# Patient Record
Sex: MALE | Race: BLACK OR AFRICAN AMERICAN | NOT HISPANIC OR LATINO | URBAN - METROPOLITAN AREA
[De-identification: names, ages, dates, MRNs, and addresses within clinical notes are randomized per-mention and may not be internally consistent; named-entity substitution may affect disease eponyms.]

---

## 2017-04-18 ENCOUNTER — OUTPATIENT (OUTPATIENT)
Dept: OUTPATIENT SERVICES | Facility: HOSPITAL | Age: 57
LOS: 1 days | Discharge: HOME | End: 2017-04-18

## 2017-06-27 DIAGNOSIS — I10 ESSENTIAL (PRIMARY) HYPERTENSION: ICD-10-CM

## 2019-07-14 ENCOUNTER — EMERGENCY (EMERGENCY)
Facility: HOSPITAL | Age: 59
LOS: 0 days | Discharge: HOME | End: 2019-07-14
Admitting: EMERGENCY MEDICINE
Payer: MEDICAID

## 2019-07-14 VITALS
DIASTOLIC BLOOD PRESSURE: 78 MMHG | RESPIRATION RATE: 18 BRPM | OXYGEN SATURATION: 98 % | SYSTOLIC BLOOD PRESSURE: 145 MMHG | TEMPERATURE: 97 F | HEART RATE: 65 BPM

## 2019-07-14 VITALS — HEIGHT: 70 IN | WEIGHT: 177.91 LBS

## 2019-07-14 DIAGNOSIS — L98.499 NON-PRESSURE CHRONIC ULCER OF SKIN OF OTHER SITES WITH UNSPECIFIED SEVERITY: ICD-10-CM

## 2019-07-14 DIAGNOSIS — L03.115 CELLULITIS OF RIGHT LOWER LIMB: ICD-10-CM

## 2019-07-14 DIAGNOSIS — Z88.6 ALLERGY STATUS TO ANALGESIC AGENT: ICD-10-CM

## 2019-07-14 DIAGNOSIS — M25.512 PAIN IN LEFT SHOULDER: ICD-10-CM

## 2019-07-14 PROCEDURE — 73030 X-RAY EXAM OF SHOULDER: CPT | Mod: 26,LT

## 2019-07-14 PROCEDURE — 99283 EMERGENCY DEPT VISIT LOW MDM: CPT

## 2019-07-14 PROCEDURE — 73630 X-RAY EXAM OF FOOT: CPT | Mod: 26,RT

## 2019-07-14 RX ORDER — AZTREONAM 2 G
1 VIAL (EA) INJECTION
Qty: 28 | Refills: 0
Start: 2019-07-14 | End: 2019-07-20

## 2019-07-14 RX ORDER — KETOROLAC TROMETHAMINE 30 MG/ML
30 SYRINGE (ML) INJECTION ONCE
Refills: 0 | Status: DISCONTINUED | OUTPATIENT
Start: 2019-07-14 | End: 2019-07-14

## 2019-07-14 RX ORDER — CEPHALEXIN 500 MG
1 CAPSULE ORAL
Qty: 40 | Refills: 0
Start: 2019-07-14 | End: 2019-07-23

## 2019-07-14 RX ADMIN — Medication 30 MILLIGRAM(S): at 11:05

## 2019-07-14 NOTE — ED PROVIDER NOTE - CLINICAL SUMMARY MEDICAL DECISION MAKING FREE TEXT BOX
57yo male with arthritis presents with L shoulder pain x 1mth and callous to R foot with surrounding erythema. Podiatry consulted, debrided callous, cultured wound and recommend podiatry outpt f/u with abx. NSAIDs and abx provided

## 2019-07-14 NOTE — ED PROVIDER NOTE - NS ED ROS FT
CONST: No fever, chills or bodyaches  CARD: No chest pain, palpitations  RESP: No SOB, cough  GI: No abdominal pain, N/V/D  MS: see HPI  SKIN: see HPI  NEURO: No  paresthesias

## 2019-07-14 NOTE — ED PROVIDER NOTE - OBJECTIVE STATEMENT
57yo male with PMHx arthritis presents c/o L shoulder pain x 1 month and pain to R foot worsening over the past 24hrs. Patient states he has had L shoulder pain worse with ROM at times, better with rest and Motrin. No new injury. Patient also reports R foot pain, noting a callous that was "shaved down" "awhile" ago to lateral aspect of R foot but never fully completely healed, stating area is now more swollen and tender and pain radiates across insole of R foot with some warmth and redness. He reports he is unable to stand on foot due to pain

## 2019-07-14 NOTE — ED ADULT TRIAGE NOTE - CHIEF COMPLAINT QUOTE
"my left shoulder has been hurting for the past month and I have a bump on the bottom of my right foot and it hurts to walk on"

## 2019-07-14 NOTE — ED PROVIDER NOTE - PROGRESS NOTE DETAILS
Discussed with podiatry for consult podiatry at bedside Podiatry debrided callous, obtained culture, dressed wound. PO abx and can f/u with podiatry clinic

## 2019-07-14 NOTE — ED PROVIDER NOTE - PHYSICAL EXAMINATION
CONST: Well appearing in NAD  NECK: Non-tender, no meningeal signs  CARD: Normal S1 S2; Normal rate and rhythm  RESP: Equal BS B/L, No wheezes, rhonchi or rales. No distress  MS: Tenderness along L AC joint. FROM of L shoulder.  SKIN: Callous, unroofed to R lateral aspect of foot with mild swelling, moderate tenderness and warmth and erythema eminating from callous site, extending horizontally to sole of R foot  NEURO: A&Ox3, No focal deficits. Strength 5/5 with no sensory deficits.

## 2019-07-14 NOTE — CONSULT NOTE ADULT - SUBJECTIVE AND OBJECTIVE BOX
PODIATRY CONSULT   CON GONZALEZ is a 58y Male Patient is a 58y old  Male who presents with a chief complaint of   HPI:    Podiatry: Pt. states that he noticed the callous on the bottom of his 5th toe a couple of days ago. Pt states that the reason the callous formed was due to new pair of shoes he had. Pt states that he does not have a podiatrist. Pt. denies any recent n/f/v/c/sob. Pt. denies any other pedal complaints at this time.        PMH:   PSH:   Medication   Allergy: Tylenol (Unknown)        Labs:                    ROS:  [x ]A ten point review of system was otherwise negative except as noted    Physical Exam - Rigtht Lower Extremity Focused:   Derm:   Hyperkeratotic lesion noted at the plantar aspect of the 5th digit. Purulent drainage noted. Streaking erythema noted surrounding the callous and ascending to the dorsum of thefoot.   Vascular:   DP and PT pulses 2/4 (BL). Cap re-fill time < then 3sec to the digits (BL).  B/L feet warm/warm  to touch.   Neuro:  - Protective sensation intact. +Pain  MSK:   Manual Muscle strength +4/5 in all muscle compartments (BL).         Assessment:   Hyperkeratotic lesion R. 5th digit    Plan:  Chart reviewed and Patient evaluated  Discussed diagnosis and treatment with patient  Wound flush with saline  Applied betadine/kerlix with dry sterile dressing  Obtained wound culture to be sent to Pathology  Debridement of callous with #15 blade  X-rays reviewed   Recommend ID consult  Continue Abx as per ID  Pt is good to go per podiatry w/ PO Abx  Patient should f/u in clinic in a couple of days at [address]  Discussed care plan  with  Attending

## 2019-07-14 NOTE — ED PROVIDER NOTE - CARE PLAN
Principal Discharge DX:	Callous ulcer  Secondary Diagnosis:	Cellulitis  Secondary Diagnosis:	Shoulder pain, left

## 2019-07-14 NOTE — ED PROVIDER NOTE - NSFOLLOWUPINSTRUCTIONS_ED_ALL_ED_FT
Foot Pain  ImageMany things can cause foot pain. Some common causes are:  An injury.  A sprain.  Arthritis.  Blisters.  Bunions.  Follow these instructions at home:  Pay attention to any changes in your symptoms. Take these actions to help with your discomfort:  If directed, put ice on the affected area:  Put ice in a plastic bag.  Place a towel between your skin and the bag.  Leave the ice on for 15–20 minutes, 3?4 times a day for 2 days.  Take over-the-counter and prescription medicines only as told by your health care provider.  Wear comfortable, supportive shoes that fit you well. Do not wear high heels.  Do not stand or walk for long periods of time.  Do not lift a lot of weight. This can put added pressure on your feet.  Do stretches to relieve foot pain and stiffness as told by your health care provider.  Rub your foot gently.  Keep your feet clean and dry.  Contact a health care provider if:  Your pain does not get better after a few days of self-care.  Your pain gets worse.  You cannot stand on your foot.  Get help right away if:  Your foot is numb or tingling.  Your foot or toes are swollen.  Your foot or toes turn white or blue.  You have warmth and redness along your foot.  This information is not intended to replace advice given to you by your health care provider. Make sure you discuss any questions you have with your health care provider.

## 2019-07-14 NOTE — ED PROVIDER NOTE - NSFOLLOWUPCLINICS_GEN_ALL_ED_FT
[facility]  Podiatry  .  [place]   Phone: [phone]  Fax:   Follow Up Time:

## 2019-07-16 LAB
CULTURE RESULTS: SIGNIFICANT CHANGE UP
SPECIMEN SOURCE: SIGNIFICANT CHANGE UP

## 2019-07-18 ENCOUNTER — EMERGENCY (EMERGENCY)
Facility: HOSPITAL | Age: 59
LOS: 0 days | Discharge: HOME | End: 2019-07-18
Attending: EMERGENCY MEDICINE | Admitting: EMERGENCY MEDICINE
Payer: MEDICAID

## 2019-07-18 VITALS
TEMPERATURE: 96 F | HEART RATE: 84 BPM | OXYGEN SATURATION: 98 % | RESPIRATION RATE: 18 BRPM | SYSTOLIC BLOOD PRESSURE: 118 MMHG | DIASTOLIC BLOOD PRESSURE: 71 MMHG

## 2019-07-18 VITALS
TEMPERATURE: 98 F | RESPIRATION RATE: 18 BRPM | SYSTOLIC BLOOD PRESSURE: 112 MMHG | OXYGEN SATURATION: 99 % | DIASTOLIC BLOOD PRESSURE: 76 MMHG | HEART RATE: 78 BPM

## 2019-07-18 DIAGNOSIS — L02.416 CUTANEOUS ABSCESS OF LEFT LOWER LIMB: ICD-10-CM

## 2019-07-18 DIAGNOSIS — M79.672 PAIN IN LEFT FOOT: ICD-10-CM

## 2019-07-18 DIAGNOSIS — Z88.1 ALLERGY STATUS TO OTHER ANTIBIOTIC AGENTS STATUS: ICD-10-CM

## 2019-07-18 DIAGNOSIS — M79.89 OTHER SPECIFIED SOFT TISSUE DISORDERS: ICD-10-CM

## 2019-07-18 PROCEDURE — 10060 I&D ABSCESS SIMPLE/SINGLE: CPT

## 2019-07-18 PROCEDURE — 99283 EMERGENCY DEPT VISIT LOW MDM: CPT | Mod: 25

## 2019-07-18 PROCEDURE — 73630 X-RAY EXAM OF FOOT: CPT | Mod: 26,RT

## 2019-07-18 RX ORDER — AZTREONAM 2 G
1 VIAL (EA) INJECTION
Qty: 20 | Refills: 0
Start: 2019-07-18 | End: 2019-07-27

## 2019-07-18 NOTE — ED PROVIDER NOTE - NS ED ROS FT
Constitutional: No fever, chills.  Eyes: No visual changes.  ENT: No hearing changes. No sore throat.  Neck: No neck pain or stiffness.  Cardiovascular: No chest pain, palpitations, edema.  Pulmonary: No SOB, cough. No hemoptysis.  Abdominal: No abdominal pain, nausea, vomiting, diarrhea.  : No dysuria, frequency.  Neuro: No headache, syncope, dizziness.  MS: No back pain. No calf pain/swelling. + foot pain.  Psych: No suicidal ideations.

## 2019-07-18 NOTE — ED PROVIDER NOTE - OBJECTIVE STATEMENT
Pt is a 59 y/o male with hx of arthritis, presents to ED for pain and swelling to lateral right foot that started several days ago. Sx's are mild, constant, worse with palpation. Pt was seen here, evaluated by podiatry, and was started on PO abx. Pt states has been compliant with abx but pain is worse since onset. No fever, chills.

## 2019-07-18 NOTE — ED ADULT NURSE NOTE - NSIMPLEMENTINTERV_GEN_ALL_ED
Implemented All Fall with Harm Risk Interventions:  [place] to call system. Call bell, personal items and telephone within reach. Instruct patient to call for assistance. Room bathroom lighting operational. Non-slip footwear when patient is off stretcher. Physically safe environment: no spills, clutter or unnecessary equipment. Stretcher in lowest position, wheels locked, appropriate side rails in place. Provide visual cue, wrist band, yellow gown, etc. Monitor gait and stability. Monitor for mental status changes and reorient to person, place, and time. Review medications for side effects contributing to fall risk. Reinforce activity limits and safety measures with patient and family. Provide visual clues: red socks.

## 2019-07-18 NOTE — ED PROVIDER NOTE - NSFOLLOWUPCLINICS_GEN_ALL_ED_FT
[facility]  Podiatry  .  [place]   Phone: [phone]  Fax:   Follow Up Time:

## 2019-07-18 NOTE — ED PROVIDER NOTE - PHYSICAL EXAMINATION
Constitutional: Well developed, well nourished. NAD.  Head: Normocephalic, atraumatic.  Eyes: PERRL. EOMI.  ENT: No nasal discharge. Mucous membranes moist.  Neck: Supple. Painless ROM.  Cardiovascular: Normal S1, S2. Regular rate and rhythm. No murmurs, rubs, or gallops.  Pulmonary: Normal respiratory rate and effort. Lungs clear to auscultation bilaterally. No wheezing, rales, or rhonchi.  Abdominal: Soft. Nondistended. Nontender. No rebound, guarding, rigidity.  Extremities. Pelvis stable. No lower extremity edema, symmetric calves. + tenderness, swelling, redness to lateral left foot, no drainage, + fluctuant.  Skin: No rashes, cyanosis.  Neuro: AAOx3. No focal neurological deficits.  Psych: Normal mood. Normal affect.

## 2019-07-18 NOTE — ED PROVIDER NOTE - ATTENDING CONTRIBUTION TO CARE
I personally evaluated the patient. I reviewed the Resident’s or Physician Assistant’s note (as assigned above), and agree with the findings and plan except as documented in my note.    Pt is a 59 y/o male with hx of arthritis, presents to ED for pain and swelling to lateral right foot that started several days ago. Sx's are mild, constant, worse with palpation. Pt was seen here, evaluated by podiatry, and was started on PO abx. Pt states has been compliant with abx but pain is worse since onset. No fever, chills.    Constitutional: Well developed, well nourished. NAD.  Head: Normocephalic, atraumatic.  Eyes: PERRL. EOMI.  ENT: No nasal discharge. Mucous membranes moist.  Neck: Supple. Painless ROM.  Cardiovascular: Normal S1, S2. Regular rate and rhythm. No murmurs, rubs, or gallops.  Pulmonary: Normal respiratory rate and effort. Lungs clear to auscultation bilaterally. No wheezing, rales, or rhonchi.  Abdominal: Soft. Nondistended. Nontender. No rebound, guarding, rigidity.  Extremities. Pelvis stable. No lower extremity edema, symmetric calves. + tenderness, swelling, redness to lateral left foot, no drainage, + fluctuant.  Skin: No rashes, cyanosis.  Neuro: AAOx3. No focal neurological deficits.  Psych: Normal mood. Normal affect.

## 2019-07-18 NOTE — ED ADULT TRIAGE NOTE - CHIEF COMPLAINT QUOTE
pt stated he was discharged from here on Sunday after IV antibiotics for infection of R foot. pt stated "I have been bed ridden for 4 days and it isn't getting better. pt denies cp, sob, dizziness, fever, chills, n/v/d.

## 2019-07-18 NOTE — ED PROVIDER NOTE - PROGRESS NOTE DETAILS
PODLOG: Call placed to podiatry. PODLOG: Discussed with podiatry who agree with management. I&D performed successfully. Will d/c with new course of oral abx and podiatry followup.

## 2019-07-18 NOTE — ED PROVIDER NOTE - CLINICAL SUMMARY MEDICAL DECISION MAKING FREE TEXT BOX
59 y/o male presented with abscess to foot after being on oral abx as outpt. No fever, concerning sx's. I&D performed with great relief, pt d/c'ed with new course of oral abx and podiatry followup as well as strict return precautions.

## 2019-08-01 ENCOUNTER — OUTPATIENT (OUTPATIENT)
Dept: OUTPATIENT SERVICES | Facility: HOSPITAL | Age: 59
LOS: 1 days | End: 2019-08-01
Payer: MEDICAID

## 2019-08-01 PROCEDURE — G9001: CPT

## 2019-08-12 DIAGNOSIS — Z71.89 OTHER SPECIFIED COUNSELING: ICD-10-CM

## 2019-08-21 ENCOUNTER — INPATIENT (INPATIENT)
Facility: HOSPITAL | Age: 59
LOS: 0 days | Discharge: LEFT BEFORE DISCHARGE | End: 2019-08-22
Attending: INTERNAL MEDICINE | Admitting: INTERNAL MEDICINE
Payer: MEDICAID

## 2019-08-21 VITALS
TEMPERATURE: 98 F | RESPIRATION RATE: 18 BRPM | HEIGHT: 70 IN | HEART RATE: 62 BPM | OXYGEN SATURATION: 98 % | SYSTOLIC BLOOD PRESSURE: 155 MMHG | WEIGHT: 177.91 LBS | DIASTOLIC BLOOD PRESSURE: 86 MMHG

## 2019-08-21 LAB
APPEARANCE UR: CLEAR — SIGNIFICANT CHANGE UP
BASOPHILS # BLD AUTO: 0.09 K/UL — SIGNIFICANT CHANGE UP (ref 0–0.2)
BASOPHILS NFR BLD AUTO: 1.3 % — HIGH (ref 0–1)
BILIRUB UR-MCNC: ABNORMAL
COLOR SPEC: ABNORMAL
DIFF PNL FLD: NEGATIVE — SIGNIFICANT CHANGE UP
EOSINOPHIL # BLD AUTO: 0.66 K/UL — SIGNIFICANT CHANGE UP (ref 0–0.7)
EOSINOPHIL NFR BLD AUTO: 9.3 % — HIGH (ref 0–8)
GLUCOSE UR QL: NEGATIVE — SIGNIFICANT CHANGE UP
HCT VFR BLD CALC: 40.1 % — LOW (ref 42–52)
HGB BLD-MCNC: 13.8 G/DL — LOW (ref 14–18)
IMM GRANULOCYTES NFR BLD AUTO: 1.4 % — HIGH (ref 0.1–0.3)
KETONES UR-MCNC: NEGATIVE — SIGNIFICANT CHANGE UP
LEUKOCYTE ESTERASE UR-ACNC: NEGATIVE — SIGNIFICANT CHANGE UP
LYMPHOCYTES # BLD AUTO: 2.36 K/UL — SIGNIFICANT CHANGE UP (ref 1.2–3.4)
LYMPHOCYTES # BLD AUTO: 33.4 % — SIGNIFICANT CHANGE UP (ref 20.5–51.1)
MCHC RBC-ENTMCNC: 30.1 PG — SIGNIFICANT CHANGE UP (ref 27–31)
MCHC RBC-ENTMCNC: 34.4 G/DL — SIGNIFICANT CHANGE UP (ref 32–37)
MCV RBC AUTO: 87.6 FL — SIGNIFICANT CHANGE UP (ref 80–94)
MONOCYTES # BLD AUTO: 0.58 K/UL — SIGNIFICANT CHANGE UP (ref 0.1–0.6)
MONOCYTES NFR BLD AUTO: 8.2 % — SIGNIFICANT CHANGE UP (ref 1.7–9.3)
NEUTROPHILS # BLD AUTO: 3.27 K/UL — SIGNIFICANT CHANGE UP (ref 1.4–6.5)
NEUTROPHILS NFR BLD AUTO: 46.4 % — SIGNIFICANT CHANGE UP (ref 42.2–75.2)
NITRITE UR-MCNC: NEGATIVE — SIGNIFICANT CHANGE UP
NRBC # BLD: 0 /100 WBCS — SIGNIFICANT CHANGE UP (ref 0–0)
PH UR: 7 — SIGNIFICANT CHANGE UP (ref 5–8)
PLATELET # BLD AUTO: 401 K/UL — HIGH (ref 130–400)
PROT UR-MCNC: NEGATIVE — SIGNIFICANT CHANGE UP
RBC # BLD: 4.58 M/UL — LOW (ref 4.7–6.1)
RBC # FLD: 15.9 % — HIGH (ref 11.5–14.5)
SP GR SPEC: 1.01 — SIGNIFICANT CHANGE UP (ref 1.01–1.02)
UROBILINOGEN FLD QL: SIGNIFICANT CHANGE UP
WBC # BLD: 7.06 K/UL — SIGNIFICANT CHANGE UP (ref 4.8–10.8)
WBC # FLD AUTO: 7.06 K/UL — SIGNIFICANT CHANGE UP (ref 4.8–10.8)

## 2019-08-21 PROCEDURE — 99285 EMERGENCY DEPT VISIT HI MDM: CPT

## 2019-08-21 PROCEDURE — 73630 X-RAY EXAM OF FOOT: CPT | Mod: 26,RT

## 2019-08-21 RX ADMIN — Medication 100 MILLIGRAM(S): at 23:08

## 2019-08-21 NOTE — ED PROVIDER NOTE - ATTENDING CONTRIBUTION TO CARE
58y m h/o arthritis p/w worsening R foot infection x 1 month. Seen in ED 2x before for same, last appx 1 mo ago, area was I&D and tx with keflex and bactrim. Has f/u appt @ Pod Clinic in Sep, but rpts worsenin erythema & pain to area (R lat/plantar foot). Also endorsed severe pruritis & dark urine since finishing abx a few days ago (was taking intermittently) - rpts h/o similar rxn in past to abx (unsure of name), seen by GI @ that time, unsure of exact dx. Denies f/c, cp/sob, nv, abd pain, flank pain. No PMD. PE: middle-aged m nad, ncat, +scleral icterus, neck supple, rrr nl s1s2 no mrg, ctab, abd soft ntnd no palpable masses no rgr, no cvat, R foot- +erythema/ttp with mild desquamation, over lat aspect of foot extending to plantar aspect, no focal fluctuance, no crepitus, gross motor/sensation intact, dpi, cr<2s, remainder of ext exam nl.

## 2019-08-21 NOTE — ED ADULT TRIAGE NOTE - CHIEF COMPLAINT QUOTE
I had an abscess on my foot that they drained and it was healing, but now its in pain, I'm dizzy, my urine is dark, I'm nauseated, and ditching - patient

## 2019-08-21 NOTE — ED PROVIDER NOTE - NS ED ROS FT
Constitutional: (-) fever  Eyes/ENT: (-) blurry vision, (-) epistaxis  Cardiovascular: (-) chest pain, (-) syncope  Respiratory: (-) cough, (-) shortness of breath  Gastrointestinal: (+) nausea (-) vomiting, (-) diarrhea  Musculoskeletal: (-) neck pain, (-) back pain, (-) joint pain (+) worsening foot pain   Integumentary: (-) rash, (-) edema  Neurological: (-) headache, (-) altered mental status  Psychiatric: (-) hallucinations  Allergic/Immunologic: (-) pruritus

## 2019-08-21 NOTE — ED PROVIDER NOTE - PHYSICAL EXAMINATION
Vital Signs: I have reviewed the initial vital signs.  Constitutional: well-nourished, appears stated age, no acute distress  Cardiovascular: regular rate, regular rhythm, well-perfused extremities  Respiratory: unlabored respiratory effort, clear to auscultation bilaterally  Gastrointestinal: soft, non-tender abdomen, no pulsatile mass  Musculoskeletal: supple neck, no lower extremity edema  Integumentary: warm, dry, no rash  Neurologic: awake, alert, cranial nerves II-XII grossly intact, extremities’ motor and sensory functions grossly intact  Psychiatric: appropriate mood, appropriate affect Vital Signs: I have reviewed the initial vital signs.  Constitutional: well-nourished, appears stated age, no acute distress  Cardiovascular: regular rate, regular rhythm, well-perfused extremities  Respiratory: unlabored respiratory effort, clear to auscultation bilaterally  Gastrointestinal: soft, non-tender abdomen  Musculoskeletal: supple neck, no lower extremity edema. Area of tenderness on R lateral aspect of foot, where previous abscess was drained. No discernible abscess at the moment. No obvious erythema.   Integumentary: warm, dry, no rash  Neurologic: awake, alert, extremities’ motor and sensory functions grossly intact  Psychiatric: appropriate mood, appropriate affect Vital Signs: I have reviewed the initial vital signs.  Constitutional: well-nourished, appears stated age, no acute distress  Cardiovascular: regular rate, regular rhythm, well-perfused extremities  Respiratory: unlabored respiratory effort, clear to auscultation bilaterally  Gastrointestinal: soft, non-tender abdomen. No CVA tenderness.   Musculoskeletal: supple neck, no lower extremity edema. Area of tenderness on R lateral aspect of foot, where previous abscess was drained. No discernible abscess at the moment. No obvious erythema.   Integumentary: warm, dry, no rash  Neurologic: awake, alert, extremities’ motor and sensory functions grossly intact  Psychiatric: appropriate mood, appropriate affect

## 2019-08-21 NOTE — ED ADULT NURSE NOTE - NSSUSCREENINGQ2_ED_ALL_ED
Called patient to discuss Damaris Vizcaino's instructions. No answer so left voice message I will call again tomorrow.   No

## 2019-08-21 NOTE — ED PROVIDER NOTE - CARE PLAN
Principal Discharge DX:	Cellulitis of foot, right  Secondary Diagnosis:	Hyperbilirubinemia  Secondary Diagnosis:	Transaminitis

## 2019-08-21 NOTE — ED PROVIDER NOTE - CLINICAL SUMMARY MEDICAL DECISION MAKING FREE TEXT BOX
R foot cellulitis, with possible drug-induced hyperbilirubinemia/transaminitis - no baseline labs for comparison - s/w Pods who rec abx and outpt f/u for foot, s/w GI who rec RUQ US, INR & admission for further mgmt - endorsed to MAR, will consult both svcs durin admission, will f/u US/coag results

## 2019-08-21 NOTE — ED PROVIDER NOTE - PROGRESS NOTE DETAILS
Spoke to Podiatry. Will wait on labs and foot xray. d/w GI fellow Dr. Olivera, rec to [facility] US, INR, med admission, will see pt in AM

## 2019-08-21 NOTE — ED ADULT NURSE NOTE - OBJECTIVE STATEMENT
pt is A&Ox3, ambulatory, able to make needs known and presents with C/O pain to right foot wound, site has no drainage, that is S/P I&D 3 weeks ago. PT reports pain developed about 5 days ago with nausea, weakness, decreased appetite and dark/concentrated urine. PT relates development of symptoms to ABX course that was prescribed after I&D.

## 2019-08-21 NOTE — ED PROVIDER NOTE - OBJECTIVE STATEMENT
The pt is a 58y M s/p foot abscess I&D on July 22nd, presenting with worsening foot pain for the past 5 days and nausea + full body pruritis. Pt was taking Bactrim and Keflex but mentions that after the nausea and pruritis, he stopped taking the antibiotics. At this point, pt is barely able to put any pressure on the foot. Has been taking Ibuprofen with little relief. Denies fevers/chills/sweats. In the past, had a similar reaction to an antibiotic but is unsure which one it was. The pt is a 58y M s/p foot abscess I&D on July 22nd, presenting with worsening foot pain for the past 5 days and nausea + full body pruritis. Pt was taking Bactrim and Keflex but mentions that after the nausea and pruritis, he stopped taking the antibiotics. In the past, had a similar reaction to an antibiotic but is unsure which one it was. At this point, pt is barely able to put any pressure on the foot. Has been taking Ibuprofen with little relief. Denies fevers/chills/sweats. Also notes dark urine and some dysuria.

## 2019-08-22 VITALS
RESPIRATION RATE: 18 BRPM | SYSTOLIC BLOOD PRESSURE: 133 MMHG | HEART RATE: 69 BPM | OXYGEN SATURATION: 98 % | TEMPERATURE: 98 F | DIASTOLIC BLOOD PRESSURE: 79 MMHG

## 2019-08-22 LAB
ALBUMIN SERPL ELPH-MCNC: 3.8 G/DL — SIGNIFICANT CHANGE UP (ref 3.5–5.2)
ALBUMIN SERPL ELPH-MCNC: 4.3 G/DL — SIGNIFICANT CHANGE UP (ref 3.5–5.2)
ALP SERPL-CCNC: 333 U/L — HIGH (ref 30–115)
ALP SERPL-CCNC: 385 U/L — HIGH (ref 30–115)
ALT FLD-CCNC: 334 U/L — HIGH (ref 0–41)
ALT FLD-CCNC: 372 U/L — HIGH (ref 0–41)
ANION GAP SERPL CALC-SCNC: 16 MMOL/L — HIGH (ref 7–14)
ANION GAP SERPL CALC-SCNC: 17 MMOL/L — HIGH (ref 7–14)
APTT BLD: 29.3 SEC — SIGNIFICANT CHANGE UP (ref 27–39.2)
AST SERPL-CCNC: 189 U/L — HIGH (ref 0–41)
AST SERPL-CCNC: 202 U/L — HIGH (ref 0–41)
BACTERIA # UR AUTO: SIGNIFICANT CHANGE UP
BASOPHILS # BLD AUTO: 0.1 K/UL — SIGNIFICANT CHANGE UP (ref 0–0.2)
BASOPHILS NFR BLD AUTO: 1.4 % — HIGH (ref 0–1)
BILIRUB DIRECT SERPL-MCNC: 2.9 MG/DL — HIGH (ref 0–0.2)
BILIRUB DIRECT SERPL-MCNC: 3.4 MG/DL — HIGH (ref 0–0.2)
BILIRUB DIRECT SERPL-MCNC: 3.6 MG/DL — HIGH (ref 0–0.2)
BILIRUB INDIRECT FLD-MCNC: 0.9 MG/DL — SIGNIFICANT CHANGE UP (ref 0.2–1.2)
BILIRUB SERPL-MCNC: 4.3 MG/DL — HIGH (ref 0.2–1.2)
BILIRUB SERPL-MCNC: 4.7 MG/DL — HIGH (ref 0.2–1.2)
BUN SERPL-MCNC: 7 MG/DL — LOW (ref 10–20)
BUN SERPL-MCNC: 9 MG/DL — LOW (ref 10–20)
CALCIUM SERPL-MCNC: 9.4 MG/DL — SIGNIFICANT CHANGE UP (ref 8.5–10.1)
CALCIUM SERPL-MCNC: 9.8 MG/DL — SIGNIFICANT CHANGE UP (ref 8.5–10.1)
CHLORIDE SERPL-SCNC: 103 MMOL/L — SIGNIFICANT CHANGE UP (ref 98–110)
CHLORIDE SERPL-SCNC: 99 MMOL/L — SIGNIFICANT CHANGE UP (ref 98–110)
CO2 SERPL-SCNC: 19 MMOL/L — SIGNIFICANT CHANGE UP (ref 17–32)
CO2 SERPL-SCNC: 21 MMOL/L — SIGNIFICANT CHANGE UP (ref 17–32)
CREAT SERPL-MCNC: 1 MG/DL — SIGNIFICANT CHANGE UP (ref 0.7–1.5)
CREAT SERPL-MCNC: 1.1 MG/DL — SIGNIFICANT CHANGE UP (ref 0.7–1.5)
EOSINOPHIL # BLD AUTO: 0.63 K/UL — SIGNIFICANT CHANGE UP (ref 0–0.7)
EOSINOPHIL NFR BLD AUTO: 8.9 % — HIGH (ref 0–8)
GLUCOSE SERPL-MCNC: 112 MG/DL — HIGH (ref 70–99)
GLUCOSE SERPL-MCNC: 126 MG/DL — HIGH (ref 70–99)
HCT VFR BLD CALC: 35.7 % — LOW (ref 42–52)
HGB BLD-MCNC: 12.3 G/DL — LOW (ref 14–18)
IMM GRANULOCYTES NFR BLD AUTO: 1.1 % — HIGH (ref 0.1–0.3)
INR BLD: 0.99 RATIO — SIGNIFICANT CHANGE UP (ref 0.65–1.3)
LACTATE SERPL-SCNC: 1.1 MMOL/L — SIGNIFICANT CHANGE UP (ref 0.5–2.2)
LIDOCAIN IGE QN: 35 U/L — SIGNIFICANT CHANGE UP (ref 7–60)
LIDOCAIN IGE QN: 36 U/L — SIGNIFICANT CHANGE UP (ref 7–60)
LYMPHOCYTES # BLD AUTO: 2.09 K/UL — SIGNIFICANT CHANGE UP (ref 1.2–3.4)
LYMPHOCYTES # BLD AUTO: 29.5 % — SIGNIFICANT CHANGE UP (ref 20.5–51.1)
MCHC RBC-ENTMCNC: 29.9 PG — SIGNIFICANT CHANGE UP (ref 27–31)
MCHC RBC-ENTMCNC: 34.5 G/DL — SIGNIFICANT CHANGE UP (ref 32–37)
MCV RBC AUTO: 86.7 FL — SIGNIFICANT CHANGE UP (ref 80–94)
MONOCYTES # BLD AUTO: 0.58 K/UL — SIGNIFICANT CHANGE UP (ref 0.1–0.6)
MONOCYTES NFR BLD AUTO: 8.2 % — SIGNIFICANT CHANGE UP (ref 1.7–9.3)
NEUTROPHILS # BLD AUTO: 3.61 K/UL — SIGNIFICANT CHANGE UP (ref 1.4–6.5)
NEUTROPHILS NFR BLD AUTO: 50.9 % — SIGNIFICANT CHANGE UP (ref 42.2–75.2)
NRBC # BLD: 0 /100 WBCS — SIGNIFICANT CHANGE UP (ref 0–0)
PLATELET # BLD AUTO: 355 K/UL — SIGNIFICANT CHANGE UP (ref 130–400)
POTASSIUM SERPL-MCNC: 3.7 MMOL/L — SIGNIFICANT CHANGE UP (ref 3.5–5)
POTASSIUM SERPL-MCNC: 4.1 MMOL/L — SIGNIFICANT CHANGE UP (ref 3.5–5)
POTASSIUM SERPL-SCNC: 3.7 MMOL/L — SIGNIFICANT CHANGE UP (ref 3.5–5)
POTASSIUM SERPL-SCNC: 4.1 MMOL/L — SIGNIFICANT CHANGE UP (ref 3.5–5)
PROT SERPL-MCNC: 7.1 G/DL — SIGNIFICANT CHANGE UP (ref 6–8)
PROT SERPL-MCNC: 7.9 G/DL — SIGNIFICANT CHANGE UP (ref 6–8)
PROTHROM AB SERPL-ACNC: 11.4 SEC — SIGNIFICANT CHANGE UP (ref 9.95–12.87)
RBC # BLD: 4.12 M/UL — LOW (ref 4.7–6.1)
RBC # FLD: 15.9 % — HIGH (ref 11.5–14.5)
SODIUM SERPL-SCNC: 137 MMOL/L — SIGNIFICANT CHANGE UP (ref 135–146)
SODIUM SERPL-SCNC: 138 MMOL/L — SIGNIFICANT CHANGE UP (ref 135–146)
WBC # BLD: 7.09 K/UL — SIGNIFICANT CHANGE UP (ref 4.8–10.8)
WBC # FLD AUTO: 7.09 K/UL — SIGNIFICANT CHANGE UP (ref 4.8–10.8)

## 2019-08-22 PROCEDURE — 99222 1ST HOSP IP/OBS MODERATE 55: CPT | Mod: AI

## 2019-08-22 PROCEDURE — 99221 1ST HOSP IP/OBS SF/LOW 40: CPT

## 2019-08-22 PROCEDURE — 99222 1ST HOSP IP/OBS MODERATE 55: CPT

## 2019-08-22 PROCEDURE — 76705 ECHO EXAM OF ABDOMEN: CPT | Mod: 26

## 2019-08-22 RX ORDER — METOCLOPRAMIDE HCL 10 MG
10 TABLET ORAL ONCE
Refills: 0 | Status: DISCONTINUED | OUTPATIENT
Start: 2019-08-22 | End: 2019-08-22

## 2019-08-22 RX ORDER — DIPHENHYDRAMINE HCL 50 MG
25 CAPSULE ORAL EVERY 6 HOURS
Refills: 0 | Status: DISCONTINUED | OUTPATIENT
Start: 2019-08-22 | End: 2019-08-22

## 2019-08-22 RX ORDER — IBUPROFEN 200 MG
600 TABLET ORAL ONCE
Refills: 0 | Status: COMPLETED | OUTPATIENT
Start: 2019-08-22 | End: 2019-08-22

## 2019-08-22 RX ORDER — DIPHENHYDRAMINE HCL 50 MG
1 CAPSULE ORAL
Qty: 28 | Refills: 0
Start: 2019-08-22 | End: 2019-08-28

## 2019-08-22 RX ORDER — ENOXAPARIN SODIUM 100 MG/ML
40 INJECTION SUBCUTANEOUS DAILY
Refills: 0 | Status: DISCONTINUED | OUTPATIENT
Start: 2019-08-22 | End: 2019-08-22

## 2019-08-22 RX ADMIN — Medication 600 MILLIGRAM(S): at 00:11

## 2019-08-22 RX ADMIN — Medication 600 MILLIGRAM(S): at 10:49

## 2019-08-22 RX ADMIN — ENOXAPARIN SODIUM 40 MILLIGRAM(S): 100 INJECTION SUBCUTANEOUS at 11:52

## 2019-08-22 NOTE — H&P ADULT - NSHPPHYSICALEXAM_GEN_ALL_CORE
Constitutional: awake and alert.  HEENT: PERRLA, EOMI  Neck: Supple.  Respiratory: Breath sounds are clear bilaterally  Cardiovascular: S1 and S2 heard regular, no audible murmurs  Gastrointestinal: soft, nontender, +ve normoactive bowel sounds, no apparent organomegaly on exam  Extremities:  right foot lesion on lateral aspect, painful on palpation, warm to touch.  Vascular: No Carotid Bruit   Musculoskeletal: no joint swelling/tenderness, no abnormal movements  Skin: No apparent rashes

## 2019-08-22 NOTE — CONSULT NOTE ADULT - ATTENDING COMMENTS
I personally interviewed and examined patient.. Most likely drug induced Liver diseases. Monitor LFT

## 2019-08-22 NOTE — H&P ADULT - ATTENDING COMMENTS
58 year old male came to ED c/o worsening right foot pain and itching. Patient was admitted in July for foot ulceration and discharged on Bactrim and Augmentin for two weeks. Symptoms started one week ago with worsening right foot pain, his lesion was stable, healed well, no signs of new ulceration, no fever or chills, he reports also itching in his arms and legs. He denies any skin rash. Patient contribute his symptoms to antibiotics because he had similar symptoms in the past after antibiotics treatment. In the ED his 58 year old male came to ED c/o worsening right foot pain and itching. Patient was admitted in July for foot ulceration and discharged on Bactrim and Augmentin for two weeks. Symptoms started one week ago with worsening right foot pain, his lesion was stable, healed well, no signs of new ulceration, no fever or chills, he reports also itching in his arms and legs. He denies any skin rash. Patient contribute his symptoms to antibiotics because he had similar symptoms in the past after antibiotics treatment. In the ED his vital signs were stable. Labs showed elevated LFTs , , Alk P: 380, TB: 4.7, abdomen US showed hepatic steatosis.     PHYSICAL EXAM:  GENERAL: NAD, well-developed  HEAD:  Atraumatic, Normocephalic  EYES: EOMI, PERRLA, conjunctiva and sclera clear  NECK: Supple, No JVD  CHEST/LUNG: Clear to auscultation bilaterally; No wheeze  HEART: Regular rate and rhythm; No murmurs, rubs, or gallops  ABDOMEN: Soft, Nontender, Nondistended; Bowel sounds present  EXTREMITIES:  2+ Peripheral Pulses, No clubbing, cyanosis, or edema  PSYCH: AAOx3  NEUROLOGY: non-focal  SKIN: No rashes or lesions    A/P:   Transaminitis:   Likely drug induced hepatic injury.   Abdomen US showed   Check viral hepatitis. Hold antibiotics.   Avoid any hepatotoxic agents.     Right foot pain:   no signs of infections on exam.   Podiatry cleared the patient for discharge.    Pruritis:   possible from acute hepatitis.   Benadryl prn.

## 2019-08-22 NOTE — H&P ADULT - ASSESSMENT
A. Puritus  - Etiologies by decreasing order of likelihood:   >Drug induced, possible reaction to Augmentin (aminopenicillins commonly used in dental prophylaxis)  >Hyperbilirubinemia (less likely, bilirubin not that elevated)  >HIV (Patient admits being tested within the past year, denies sexual activity for the past 2 years, denies IV drug use, denies receiving blood)  - Benadryl 25mg orally q6h as needed.    B. Foot pain  - Hold antibiotics  - Send wound for cultures  - Consult ID for follow up   - Consult podiatry for followup  - Foot X-ray to rule out osteomyelitis (less likely as per physical exam)    C. Elevated transaminases  - Etilogies by decreasing order of likelihood:  >Drug induced, discontinue antibiotics, trend LFTs  >Biliary tree disease/stone, less likely given symptoms and US abdomen finding, trend LFTs  - No intervention necessary at present time.    Diet: Regular  DVT prophylaxis: Lovenox 40mg subQ daily  Dispo: Home

## 2019-08-22 NOTE — DISCHARGE NOTE NURSING/CASE MANAGEMENT/SOCIAL WORK - NSDCDPATPORTLINK_GEN_ALL_CORE
You can access the [facility] Portal, offered by , by registering with the following website: [email or web address]

## 2019-08-22 NOTE — H&P ADULT - HISTORY OF PRESENT ILLNESS
58 year old male patient presented to [facility]'s ER for puritus and right foot pain.     Patient had been previously diagnosed with a hyperkeratotic lesion right 5th digit.  Patient not very reliable with history, does not really recall how long he took the antibiotics for or when did he actually stop them. Approximative dates are as such: Started on antibiotics on the 14th of July (Cultures grew Coag Negative Staphylococcus and Corynebacterium species, both  Susceptibilities not performed), incision/drainage done on 18th of July, discontinued Cefalexin a few days later and took Augmentin and Bactrim for approximately 2 weeks at which point patient describes marked amelioration in his right foot pain. 6-9 days ago, the patient experiences recurrence of his foot pain and self-administers his remaining antibiotics (Bactrim and Augmentin).   One day after resumption of antibiotics, the patient experiences nausea, no vomiting and generalized puritus. No visible skin rash, no fever, no chills, no diaphoresis, no abdominal pain, no change in bowel habits or stool color.   Patient mentions mild dysuria which he describes as "feeling not normal" but not burning. Urine color is dark but seems non-bloody according to the patient.   No other remarkable symptoms.     Of note: Patient mentions similar event happening previously after taking antibiotic prophylaxis a few years back for dental procedure.

## 2019-08-22 NOTE — H&P ADULT - NSHPLABSRESULTS_GEN_ALL_CORE
13.8   7.06  )-----------( 401      ( 21 Aug 2019 23:01 )             40.1     08-21    137  |  99  |  7<L>  ----------------------------<  112<H>  4.1   |  21  |  1.0    Ca    9.8      21 Aug 2019 23:01    TPro  x   /  Alb  x   /  TBili  x   /  DBili  2.9<H>  /  AST  x   /  ALT  x   /  AlkPhos  x   08-22      Abdomen U/S    LIVER: Diffusely hyperechoic liver is consistent with hepatic steatosis.   Normal contour, measuring 14.7 cm in length. No focal intrahepatic   lesions.    GALLBLADDER/BILIARY TREE: No cholelithiasis. Decompressed gallbladder. No   pericholecystic fluid or sonographic Morrell's sign. No intrahepatic   biliary ductal dilatation. The common bile duct measures 5 mm, which is   normal    PANCREAS: The pancreas is obscured by overlying bowel gas.    KIDNEYS: The right kidney measures 9.6 cm in length. No hydronephrosis.    ASCITES: None      IMPRESSION:    Hepatic steatosis. Otherwise unremarkable abdominal sonogram.

## 2019-08-22 NOTE — CONSULT NOTE ADULT - SUBJECTIVE AND OBJECTIVE BOX
Patient is a 58y old  Male who presents with a chief complaint of Puritus, right foot pain (22 Aug 2019 08:10)      HPI:  58 year old male patient presented to [facility]'s ER for puritus and right foot pain.     Patient had been previously diagnosed with a hyperkeratotic lesion right 5th digit.  Patient not very reliable with history, does not really recall how long he took the antibiotics for or when did he actually stop them. Approximative dates are as such: Started on antibiotics on the 14th of July (Cultures grew Coag Negative Staphylococcus and Corynebacterium species, both  Susceptibilities not performed), incision/drainage done on 18th of July, discontinued Cefalexin a few days later and took Augmentin and Bactrim for approximately 2 weeks at which point patient describes marked amelioration in his right foot pain. 6-9 days ago, the patient experiences recurrence of his foot pain and self-administers his remaining antibiotics (Bactrim and Augmentin).   One day after resumption of antibiotics, the patient experiences nausea, no vomiting and generalized puritus. No visible skin rash, no fever, no chills, no diaphoresis, no abdominal pain, no change in bowel habits or stool color.   Patient mentions mild dysuria which he describes as "feeling not normal" but not burning. Urine color is dark but seems non-bloody according to the patient.   No other remarkable symptoms.     Of note: Patient mentions similar event happening previously after taking antibiotic prophylaxis a few years back for dental procedure. (22 Aug 2019 08:10)    GI INTERVAL HISTORY:         PAST MEDICAL & SURGICAL HISTORY:        MEDICATIONS  (STANDING):  enoxaparin Injectable 40 milliGRAM(s) SubCutaneous daily    MEDICATIONS  (PRN):  diphenhydrAMINE 25 milliGRAM(s) Oral every 6 hours PRN Rash and/or Itching  metoclopramide Injectable 10 milliGRAM(s) IV Push once PRN Nausea and/or Vomiting      Allergies    Tylenol (Unknown)    FAMILY HISTORY:    SOCIAL    REVIEW OF SYSTEMS  General: mild fatigue   Skin: no new changes   Ophtalmologic: no new visual changes   Respiratory: no new shortness of breath   Cardiovascular: no new chest pain   Gastrointestinal: as per H&P   Genitourinary: no new dysuria   Neurological: no new weakness   otherwise as described above     Vital Signs Last 24 Hrs  T(C): 36.4 (22 Aug 2019 08:17), Max: 36.7 (22 Aug 2019 00:15)  T(F): 97.6 (22 Aug 2019 08:17), Max: 98 (22 Aug 2019 00:15)  HR: 57 (22 Aug 2019 08:17) (57 - 69)  BP: 152/81 (22 Aug 2019 08:17) (127/58 - 155/86)  BP(mean): --  RR: 18 (22 Aug 2019 08:17) (18 - 18)  SpO2: 97% (22 Aug 2019 08:17) (97% - 98%)    GENERAL:  no distress  SKIN: no new changes   HEENT:  NC/AT,  anicteric  CHEST:   no new increased effort, breath sounds clear  HEART:  Regular rhythm  ABDOMEN:  Soft, non-tender  EXTREMITIES:  no new cyanosis  PSYCHIATRIC: normal affect       CBC Full  -  ( 21 Aug 2019 23:01 )  WBC Count : 7.06 K/uL  RBC Count : 4.58 M/uL  Hemoglobin : 13.8 g/dL  Hematocrit : 40.1 %  Platelet Count - Automated : 401 K/uL  Mean Cell Volume : 87.6 fL  Mean Cell Hemoglobin : 30.1 pg  Mean Cell Hemoglobin Concentration : 34.4 g/dL  Auto Neutrophil # : 3.27 K/uL  Auto Lymphocyte # : 2.36 K/uL  Auto Monocyte # : 0.58 K/uL  Auto Eosinophil # : 0.66 K/uL  Auto Basophil # : 0.09 K/uL  Auto Neutrophil % : 46.4 %  Auto Lymphocyte % : 33.4 %  Auto Monocyte % : 8.2 %  Auto Eosinophil % : 9.3 %  Auto Basophil % : 1.3 %      Bilirubin Direct, Serum: 2.9 mg/dL (08-22-19 @ 01:45)  INR: 0.99 ratio (08-22-19 @ 01:45)  Bilirubin Direct, Serum: 3.6 mg/dL (08-22-19 @ 00:28)  Hemoglobin: 13.8 g/dL (08-21-19 @ 23:01)  Bilirubin Total, Serum: 4.7 mg/dL (08-21-19 @ 23:01)  Aspartate Aminotransferase (AST/SGOT): 202 U/L (08-21-19 @ 23:01)  Alanine Aminotransferase (ALT/SGPT): 372 U/L (08-21-19 @ 23:01)  Alkaline Phosphatase, Serum: 385 U/L (08-21-19 @ 23:01)      PT/INR - ( 22 Aug 2019 01:45 )   PT: 11.40 sec;   INR: 0.99 ratio         PTT - ( 22 Aug 2019 01:45 )  PTT:29.3 sec    08-21    137  |  99  |  7<L>  ----------------------------<  112<H>  4.1   |  21  |  1.0    Ca    9.8      21 Aug 2019 23:01    TPro  x   /  Alb  x   /  TBili  x   /  DBili  2.9<H>  /  AST  x   /  ALT  x   /  AlkPhos  x   08-22        AMYLASE                  08-22 @ 01:45   --  LIPASE                   08-22 @ 01:45  35  HCG  --                    08-22 @ 01:45    AMYLASE                  08-22 @ 00:28   --  LIPASE                   08-22 @ 00:28  36  HCG  --                    08-22 @ 00:28        RADIOLOGY    US Abdomen Limited (08.22.19 @ 04:39)     LIVER: Diffusely hyperechoic liver is consistent with hepatic steatosis.   Normal contour, measuring 14.7 cm in length. No focal intrahepatic   lesions.  GALLBLADDER/BILIARY TREE: No cholelithiasis. Decompressed gallbladder. No   pericholecystic fluid or sonographic Morrell's sign. No intrahepatic   biliary ductal dilatation. The common bile duct measures 5 mm, which is   normal  PANCREAS: The pancreas is obscured by overlying bowel gas.  KIDNEYS: The right kidney measures 9.6 cm in length. No hydronephrosis.  ASCITES: None      IMPRESSION:  Hepatic steatosis. Otherwise unremarkable abdominal sonogram. Patient is a 58y old  Male who presents with a chief complaint of Puritus, right foot pain (22 Aug 2019 08:10)      HPI:  58 year old male patient presented to [facility]'s ER for puritus and right foot pain.     Patient had been previously diagnosed with a hyperkeratotic lesion right 5th digit.  Patient not very reliable with history, does not really recall how long he took the antibiotics for or when did he actually stop them. Approximative dates are as such: Started on antibiotics on the 14th of July (Cultures grew Coag Negative Staphylococcus and Corynebacterium species, both  Susceptibilities not performed), incision/drainage done on 18th of July, discontinued Cefalexin a few days later and took Augmentin and Bactrim for approximately 2 weeks at which point patient describes marked amelioration in his right foot pain. 6-9 days ago, the patient experiences recurrence of his foot pain and self-administers his remaining antibiotics (Bactrim and Augmentin).   One day after resumption of antibiotics, the patient experiences nausea, no vomiting and generalized puritus. No visible skin rash, no fever, no chills, no diaphoresis, no abdominal pain, no change in bowel habits or stool color.   Patient mentions mild dysuria which he describes as "feeling not normal" but not burning. Urine color is dark but seems non-bloody according to the patient.   No other remarkable symptoms.     Of note: Patient mentions similar event happening previously after taking antibiotic prophylaxis a few years back for dental procedure. (22 Aug 2019 08:10)    GI INTERVAL HISTORY: Pt complains of pruritus, generalized. Pt also complains of mild nausea with loss of appetite, since 1 week (the same time when he started taking antibiotics). No abdominal pain, vomiting, diarrhea, constipation.   Pt takes ibuprofen 200mg 5-6 tabs every other day for pain. No other OTC drugs.   Pt denies history of alcohol use, is an active smoker (5-6 cig/day), no h/o herbal or dietary supplements intake, no hx of IVDU. Pt was never treated for Hepatitis B, C, A. Pt has hx of left shoulder pain, no other arthritis, no hx of DM, skin changes. No family hx of liver disease.   Pt has hx of acetaminophen overdose in 2002 , when he had really high liver enzyme levels (does not remember how high), with pruritus as the main symptom, and another episode of pruritus with high liver enzymes when he got antibiotics (does not know which ones) for a dental procedure.       PAST MEDICAL & SURGICAL HISTORY:  Hx of acetaminophen overdose      MEDICATIONS  (STANDING):  enoxaparin Injectable 40 milliGRAM(s) SubCutaneous daily    MEDICATIONS  (PRN):  diphenhydrAMINE 25 milliGRAM(s) Oral every 6 hours PRN Rash and/or Itching  metoclopramide Injectable 10 milliGRAM(s) IV Push once PRN Nausea and/or Vomiting      Allergies  Tylenol -pruritus    FAMILY HISTORY: No significant family hx    SOCIAL- No alcohol use; Active smoker ( 5-6 cigarettes/day); No IVDU    REVIEW OF SYSTEMS  General: mild fatigue  Skin: no new changes   Opthalmologic no new visual changes   Respiratory: no new shortness of breath   Cardiovascular: no new chest pain   Gastrointestinal: as per H&P   Genitourinary: no new dysuria   Neurological: no new weakness   Skin: generalized pruritus  otherwise as described above     Vital Signs Last 24 Hrs  T(C): 36.4 (22 Aug 2019 08:17), Max: 36.7 (22 Aug 2019 00:15)  T(F): 97.6 (22 Aug 2019 08:17), Max: 98 (22 Aug 2019 00:15)  HR: 57 (22 Aug 2019 08:17) (57 - 69)  BP: 152/81 (22 Aug 2019 08:17) (127/58 - 155/86)  RR: 18 (22 Aug 2019 08:17) (18 - 18)  SpO2: 97% (22 Aug 2019 08:17) (97% - 98%)    GENERAL:  no distress  SKIN: no new changes   HEENT:  NC/AT,  anicteric  CHEST:   no new increased effort, breath sounds clear  HEART:  Regular rhythm  ABDOMEN:  Soft, non-tender, no excoriations seen  EXTREMITIES:  no new cyanosis; left foot amputation.   PSYCHIATRIC: normal affect       CBC Full  -  ( 21 Aug 2019 23:01 )  WBC Count : 7.06 K/uL  RBC Count : 4.58 M/uL  Hemoglobin : 13.8 g/dL  Hematocrit : 40.1 %  Platelet Count - Automated : 401 K/uL  Mean Cell Volume : 87.6 fL  Mean Cell Hemoglobin : 30.1 pg  Mean Cell Hemoglobin Concentration : 34.4 g/dL  Auto Neutrophil # : 3.27 K/uL  Auto Lymphocyte # : 2.36 K/uL  Auto Monocyte # : 0.58 K/uL  Auto Eosinophil # : 0.66 K/uL  Auto Basophil # : 0.09 K/uL  Auto Neutrophil % : 46.4 %  Auto Lymphocyte % : 33.4 %  Auto Monocyte % : 8.2 %  Auto Eosinophil % : 9.3 %  Auto Basophil % : 1.3 %      Bilirubin Direct, Serum: 2.9 mg/dL (08-22-19 @ 01:45)  INR: 0.99 ratio (08-22-19 @ 01:45)  Bilirubin Direct, Serum: 3.6 mg/dL (08-22-19 @ 00:28)  Hemoglobin: 13.8 g/dL (08-21-19 @ 23:01)  Bilirubin Total, Serum: 4.7 mg/dL (08-21-19 @ 23:01)  Aspartate Aminotransferase (AST/SGOT): 202 U/L (08-21-19 @ 23:01)  Alanine Aminotransferase (ALT/SGPT): 372 U/L (08-21-19 @ 23:01)  Alkaline Phosphatase, Serum: 385 U/L (08-21-19 @ 23:01)      PT/INR - ( 22 Aug 2019 01:45 )   PT: 11.40 sec;   INR: 0.99 ratio         PTT - ( 22 Aug 2019 01:45 )  PTT:29.3 sec    08-21    137  |  99  |  7<L>  ----------------------------<  112<H>  4.1   |  21  |  1.0    Ca    9.8      21 Aug 2019 23:01    TPro  x   /  Alb  x   /  TBili  x   /  DBili  2.9<H>  /  AST  x   /  ALT  x   /  AlkPhos  x   08-22    LIPASE                   08-22 @ 01:45  35  LIPASE                   08-22 @ 00:28  36      RADIOLOGY    US Abdomen Limited (08.22.19 @ 04:39)     LIVER: Diffusely hyperechoic liver is consistent with hepatic steatosis.   Normal contour, measuring 14.7 cm in length. No focal intrahepatic   lesions.  GALLBLADDER/BILIARY TREE: No cholelithiasis. Decompressed gallbladder. No   pericholecystic fluid or sonographic Morrell's sign. No intrahepatic   biliary ductal dilatation. The common bile duct measures 5 mm, which is   normal  PANCREAS: The pancreas is obscured by overlying bowel gas.  KIDNEYS: The right kidney measures 9.6 cm in length. No hydronephrosis.  ASCITES: None      IMPRESSION:  Hepatic steatosis. Otherwise unremarkable abdominal sonogram.

## 2019-08-22 NOTE — CONSULT NOTE ADULT - ASSESSMENT
57 yo male with no significant past medical history , admitted for right foot cellulitis, found to have elevated liver enzymes (mixed pattern) with no functional liver tests abnormalities.     *INCOMPLETE NOTE; ATTENDING RECS PENDING**    #Elevated liver enzymes (, , , T bili 4.7, D bili 3.6) With normal INR and albumin  -Mixed pattern of liver injury with no evidence of acute liver failure or cirrhosis  -Likely DILI (pt has been using Augmentin and bactrim outpatient) and hx of previous episodes in the past with antibiotics and Tylenol. Discontinue the offending drugs.   -No RUQ pain or tenderness; US Abdo showed hepatic steatosis; No signs/evidence of obstructive pathology. CBD normal. No cholelithiasis or choledocholithiasis.   -Check acute viral hepatitis panel  -Trend liver enzymes  -Outpt GI follow up in hepatology clinic 59 yo male with no significant past medical history , admitted for right foot cellulitis, found to have elevated liver enzymes (mixed pattern) with no functional liver tests abnormalities.     #Elevated liver enzymes (, , , T bili 4.7, D bili 3.6) With normal INR and albumin  -Mixed pattern of liver injury with no evidence of acute liver failure or cirrhosis  -Likely DILI (pt has been using Augmentin and bactrim outpatient) and hx of previous episodes in the past with antibiotics and Tylenol. Discontinue the offending drugs.   -No RUQ pain or tenderness; US Abdo showed hepatic steatosis; No signs/evidence of obstructive pathology. CBD normal. No cholelithiasis or choledocholithiasis.   -Check acute viral hepatitis panel and serum ferritin  -Trend liver enzymes  -Outpt GI follow up in hepatology/liver clinic (as liver enzymes downtrending)

## 2019-08-22 NOTE — CONSULT NOTE ADULT - SUBJECTIVE AND OBJECTIVE BOX
Podiatry Consult Note    Subjective:   CON GONZALEZ is a pleasant well-nourished, well developed 58y Male in no acute distress, alert awake, and oriented to person, place and time.   Patient is a 58y old  Male who presents with a chief complaint of Right Foot Pain. Patient says his right foot has been hurting for the past 4 days and it has become unbearable. Patient was previously seen by Podiatry for a painful callus on his right foot and told to f/u in clinic for maintenance. Patient denies Trauma to the Right Foot. Currently patient denies F/N/V and shortness of breath    HPI:  Past Medical History and Surgical History  PAST MEDICAL & SURGICAL HISTORY:     Review of Systems:   Constitutional: No weakness, fevers or chills  Eyes / ENT: No visual changes; No vertigo or throat pain   Neck: No pain or stiffness  Respiratory: No cough, wheezing, hemoptysis; No shortness of breath  Cardiovascular: No chest pain or palpitations  Gastrointestinal: No abdominal or epigastric pain. No nausea, vomiting, or hematemesis; No diarrhea or constipation. No melena or hematochezia.  Genitourinary: No dysuria, frequency or hematuria  Neurological: No numbness or weakness  Skin: Right Foot Plantar Midfoot bruising     Objective:  Vital Signs Last 24 Hrs  T(C): 36.7 (22 Aug 2019 00:15), Max: 36.7 (22 Aug 2019 00:15)  T(F): 98 (22 Aug 2019 00:15), Max: 98 (22 Aug 2019 00:15)  HR: 69 (22 Aug 2019 00:15) (62 - 69)  BP: 127/58 (22 Aug 2019 00:15) (127/58 - 155/86)  BP(mean): --  RR: 18 (22 Aug 2019 00:15) (18 - 18)  SpO2: 98% (22 Aug 2019 00:15) (98% - 98%)                        13.8   7.06  )-----------( 401      ( 21 Aug 2019 23:01 )             40.1                 08-21    137  |  99  |  7<L>  ----------------------------<  112<H>  4.1   |  21  |  1.0    Ca    9.8      21 Aug 2019 23:01    TPro  x   /  Alb  x   /  TBili  x   /  DBili  2.9<H>  /  AST  x   /  ALT  x   /  AlkPhos  x   08-22    Physical Exam - Lower Extremity Focused: Right Foot   Derm:   Closed Ulcer noted on the plantar lateral midfoot  Mild Bruising and erythema noted on the plantar midfoot  No Fluctuance, mal-odor, drainage present.     Vascular:   DP and PT Pulses were 2/4 on the Right  Temperature was warm to warm on the right    Neuro:  Protective Sensation Intact     MSK:   Manual Muscle strength 4/5 in all muscle compartments B/L  Pain on palpation of the plantar and lateral midfoot / Medial Tubercle     Assessment:   Right Plantar Foot Pain  Cellulitis     Plan:  Chart reviewed and Patient evaluated. All questions and concerns were addressed and answered  XR Reviewed by Resident; no signs of gas locules, Fracture, Abscess, Jonny Abnormalities; Pending Report  CBC; WBC: 7.06  Attending Updated / Attending will see during Afternoon Rounds Podiatry Consult Note    Subjective:   CON GONZALEZ is a pleasant well-nourished, well developed 58y Male in no acute distress, alert awake, and oriented to person, place and time.   Patient is a 58y old  Male who presents with a chief complaint of Right Foot Pain. Patient says his right foot has been hurting for the past 4 days and it has become unbearable. Patient was previously seen by Podiatry for a painful callus on his right foot and told to f/u in clinic for maintenance. Patient denies Trauma to the Right Foot. Currently patient denies F/N/V and shortness of breath    HPI:  Past Medical History and Surgical History  PAST MEDICAL & SURGICAL HISTORY:     Review of Systems:   Constitutional: No weakness, fevers or chills  Eyes / ENT: No visual changes; No vertigo or throat pain   Neck: No pain or stiffness  Respiratory: No cough, wheezing, hemoptysis; No shortness of breath  Cardiovascular: No chest pain or palpitations  Gastrointestinal: No abdominal or epigastric pain. No nausea, vomiting, or hematemesis; No diarrhea or constipation. No melena or hematochezia.  Genitourinary: No dysuria, frequency or hematuria  Neurological: No numbness or weakness  Skin: Right Foot Plantar Midfoot bruising     Objective:  Vital Signs Last 24 Hrs  T(C): 36.7 (22 Aug 2019 00:15), Max: 36.7 (22 Aug 2019 00:15)  T(F): 98 (22 Aug 2019 00:15), Max: 98 (22 Aug 2019 00:15)  HR: 69 (22 Aug 2019 00:15) (62 - 69)  BP: 127/58 (22 Aug 2019 00:15) (127/58 - 155/86)  BP(mean): --  RR: 18 (22 Aug 2019 00:15) (18 - 18)  SpO2: 98% (22 Aug 2019 00:15) (98% - 98%)                        13.8   7.06  )-----------( 401      ( 21 Aug 2019 23:01 )             40.1                 08-21    137  |  99  |  7<L>  ----------------------------<  112<H>  4.1   |  21  |  1.0    Ca    9.8      21 Aug 2019 23:01    TPro  x   /  Alb  x   /  TBili  x   /  DBili  2.9<H>  /  AST  x   /  ALT  x   /  AlkPhos  x   08-22    Physical Exam - Lower Extremity Focused: Right Foot   Derm:   Closed Ulcer noted on the plantar lateral midfoot  Mild Bruising and erythema noted on the plantar midfoot  No Fluctuance, mal-odor, drainage present.     Vascular:   DP and PT Pulses were 2/4 on the Right  Temperature was warm to warm on the right    Neuro:  Protective Sensation Intact     MSK:   Manual Muscle strength 4/5 in all muscle compartments B/L  Pain on palpation of the plantar and lateral midfoot / Medial Tubercle     Assessment:   Right Plantar Foot Pain  Cellulitis     Plan:  Chart reviewed and Patient evaluated. All questions and concerns were addressed and answered    Right Dorsal / Plantar foot was Anesthestized w/ 1% Lidocaine w/ #25 Needle   Right Plantar Midfoot was debrided w/ a #15 blade down to the level of the subcutaneous level   Area around incision site was explored for foreign body and Possible pockets of purulence;  #No Foreign Body or Pockets of Purulence Found     XR Reviewed; No OM or Abnormalities   CBC; WBC: 7.06  Patient is stable for discharge per Podiatry Standpoint; can follow up as an o/p @ [address] [facility] [address] w/ Dr. Curtis Parrish   Attending Updated and Aware

## 2019-08-22 NOTE — H&P ADULT - NSHPREVIEWOFSYSTEMS_GEN_ALL_CORE
CONSTITUTIONAL: No weakness, fevers or chills  EYES/ENT: No visual changes;  No vertigo or throat pain   NECK: No pain or stiffness  RESPIRATORY: No cough, wheezing, hemoptysis; No shortness of breath  CARDIOVASCULAR: No chest pain or palpitations  GASTROINTESTINAL: No abdominal or epigastric pain. No nausea, vomiting, or hematemesis; No diarrhea or constipation. No melena or hematochezia.  GENITOURINARY: No dysuria, frequency or hematuria  NEUROLOGICAL: No numbness or weakness  SKIN: Diffuse itching, no rashes

## 2019-08-22 NOTE — DISCHARGE NOTE PROVIDER - NSDCCPCAREPLAN_GEN_ALL_CORE_FT
PRINCIPAL DISCHARGE DIAGNOSIS  Diagnosis: Leg ulcer  Assessment and Plan of Treatment: - Xray of the lower extremity did not reveal osteomyelitis or foreign body.   - please follow-up with podiatry, Dr. Parrish outpatient      SECONDARY DISCHARGE DIAGNOSES  Diagnosis: Transaminitis  Assessment and Plan of Treatment: No abdominal pain, no fevers, liver enzymes trending slightly downward   Ultrasound of the liver demonstrated a fatty liver  Likely drug related, however you will need to obtain a chronic liver disease work-up   follow-up with the liver clinic in 1-2 weeks  follow-up with your PMD in 1-2 weeks

## 2019-08-22 NOTE — DISCHARGE NOTE PROVIDER - NSDCFUADDINST_GEN_ALL_CORE_FT
follow-up with your PMD within 1-2 weeks  follow-up in the liver clinic, trend liver enzymes outpatient, within 1 week  follow-up with your podiatrist within 1-2 weeks

## 2019-08-22 NOTE — H&P ADULT - DOES THIS PATIENT HAVE A HISTORY OF OR HAS BEEN DX WITH HEART FAILURE?
"Pt left floor against recommendation around 9pm and without notifying team of actions. Earlier stated to NA she wanted to bring her  to the car. Instruction not to leave the floor was given at that time. Pt still has not returned and it is now 10pm.  called with Overhead page \"Ms. Escobedo, please return to the sixth floor.\"  " no

## 2019-08-22 NOTE — DISCHARGE NOTE PROVIDER - CARE PROVIDER_API CALL
Curtis Parrish ([email or web address])  [facility]  [address] [facility]  [place] [address]  Phone: [phone]  Fax: [phone]  Follow Up Time:     Yovany Alcala)  Gastroenterology; Internal Medicine  [address] [place] [address]  Phone: [phone]  Fax: [phone]  Follow Up Time:

## 2019-08-22 NOTE — DISCHARGE NOTE PROVIDER - HOSPITAL COURSE
58 year old male patient presented to [facility]'s ER for puritus and right foot pain. Patient had been previously diagnosed with a hyperkeratotic lesion right 5th digit.    Patient not very reliable with history, does not really recall how long he took the antibiotics for or when did he actually stop them. Approximative dates are as such: Started on antibiotics on the 14th of July (Cultures grew Coag Negative Staphylococcus and Corynebacterium species, both  Susceptibilities not performed), incision/drainage done on 18th of July, discontinued Cefalexin a few days later and took Augmentin and Bactrim for approximately 2 weeks at which point patient describes marked amelioration in his right foot pain. 6-9 days ago, the patient experiences recurrence of his foot pain and self-administers his remaining antibiotics (Bactrim and Augmentin).     One day after resumption of antibiotics, the patient experiences nausea, no vomiting and generalized puritus. No visible skin rash, no fever, no chills, no diaphoresis, no abdominal pain, no change in bowel habits or stool color. Patient mentions mild dysuria which he describes as "feeling not normal" but not burning. Urine color is dark but seems non-bloody according to the patient. No other remarkable symptoms. Of note: Patient mentions similar event happening previously after taking antibiotic prophylaxis a few years back for dental procedure.        The pt was c/o slight pain in his foot, denied ab pain during his hospital stay. The pt's LFTs were slightly trending down during his stay. He was seen by GI and instructed to f/u with liver clinic OP for a CLD w/u. A xray of the RLE showed ulceration however, no gas, no suspicion for an active infection. The pt was also seen by podiatry who recommended OP f/u.     The pt was medically stable and agreeable for d/c today.

## 2019-08-23 LAB
CULTURE RESULTS: SIGNIFICANT CHANGE UP
FERRITIN SERPL-MCNC: 710 NG/ML — HIGH (ref 30–400)
SPECIMEN SOURCE: SIGNIFICANT CHANGE UP

## 2019-08-27 DIAGNOSIS — L03.115 CELLULITIS OF RIGHT LOWER LIMB: ICD-10-CM

## 2019-08-27 DIAGNOSIS — F17.210 NICOTINE DEPENDENCE, CIGARETTES, UNCOMPLICATED: ICD-10-CM

## 2019-08-27 DIAGNOSIS — L29.9 PRURITUS, UNSPECIFIED: ICD-10-CM

## 2019-08-27 DIAGNOSIS — B17.9 ACUTE VIRAL HEPATITIS, UNSPECIFIED: ICD-10-CM

## 2019-08-27 DIAGNOSIS — L97.519 NON-PRESSURE CHRONIC ULCER OF OTHER PART OF RIGHT FOOT WITH UNSPECIFIED SEVERITY: ICD-10-CM

## 2021-06-02 ENCOUNTER — EMERGENCY (EMERGENCY)
Facility: HOSPITAL | Age: 61
LOS: 0 days | Discharge: HOME | End: 2021-06-02
Attending: STUDENT IN AN ORGANIZED HEALTH CARE EDUCATION/TRAINING PROGRAM | Admitting: STUDENT IN AN ORGANIZED HEALTH CARE EDUCATION/TRAINING PROGRAM
Payer: MEDICAID

## 2021-06-02 VITALS — WEIGHT: 179.9 LBS | HEIGHT: 70 IN

## 2021-06-02 VITALS
TEMPERATURE: 97 F | DIASTOLIC BLOOD PRESSURE: 74 MMHG | SYSTOLIC BLOOD PRESSURE: 146 MMHG | HEART RATE: 87 BPM | OXYGEN SATURATION: 98 % | RESPIRATION RATE: 20 BRPM

## 2021-06-02 DIAGNOSIS — F17.200 NICOTINE DEPENDENCE, UNSPECIFIED, UNCOMPLICATED: ICD-10-CM

## 2021-06-02 DIAGNOSIS — R42 DIZZINESS AND GIDDINESS: ICD-10-CM

## 2021-06-02 DIAGNOSIS — Z88.8 ALLERGY STATUS TO OTHER DRUGS, MEDICAMENTS AND BIOLOGICAL SUBSTANCES STATUS: ICD-10-CM

## 2021-06-02 LAB
ALBUMIN SERPL ELPH-MCNC: 4.5 G/DL — SIGNIFICANT CHANGE UP (ref 3.5–5.2)
ALP SERPL-CCNC: 108 U/L — SIGNIFICANT CHANGE UP (ref 30–115)
ALT FLD-CCNC: 35 U/L — SIGNIFICANT CHANGE UP (ref 0–41)
ANION GAP SERPL CALC-SCNC: 12 MMOL/L — SIGNIFICANT CHANGE UP (ref 7–14)
AST SERPL-CCNC: 24 U/L — SIGNIFICANT CHANGE UP (ref 0–41)
BASOPHILS # BLD AUTO: 0.07 K/UL — SIGNIFICANT CHANGE UP (ref 0–0.2)
BASOPHILS NFR BLD AUTO: 0.8 % — SIGNIFICANT CHANGE UP (ref 0–1)
BILIRUB SERPL-MCNC: 0.2 MG/DL — SIGNIFICANT CHANGE UP (ref 0.2–1.2)
BUN SERPL-MCNC: 16 MG/DL — SIGNIFICANT CHANGE UP (ref 10–20)
CALCIUM SERPL-MCNC: 9.7 MG/DL — SIGNIFICANT CHANGE UP (ref 8.5–10.1)
CHLORIDE SERPL-SCNC: 101 MMOL/L — SIGNIFICANT CHANGE UP (ref 98–110)
CO2 SERPL-SCNC: 25 MMOL/L — SIGNIFICANT CHANGE UP (ref 17–32)
CREAT SERPL-MCNC: 1.1 MG/DL — SIGNIFICANT CHANGE UP (ref 0.7–1.5)
EOSINOPHIL # BLD AUTO: 0.22 K/UL — SIGNIFICANT CHANGE UP (ref 0–0.7)
EOSINOPHIL NFR BLD AUTO: 2.6 % — SIGNIFICANT CHANGE UP (ref 0–8)
GLUCOSE SERPL-MCNC: 117 MG/DL — HIGH (ref 70–99)
HCT VFR BLD CALC: 41.7 % — LOW (ref 42–52)
HGB BLD-MCNC: 14.1 G/DL — SIGNIFICANT CHANGE UP (ref 14–18)
IMM GRANULOCYTES NFR BLD AUTO: 0.6 % — HIGH (ref 0.1–0.3)
LYMPHOCYTES # BLD AUTO: 1.98 K/UL — SIGNIFICANT CHANGE UP (ref 1.2–3.4)
LYMPHOCYTES # BLD AUTO: 23.1 % — SIGNIFICANT CHANGE UP (ref 20.5–51.1)
MAGNESIUM SERPL-MCNC: 2.1 MG/DL — SIGNIFICANT CHANGE UP (ref 1.8–2.4)
MCHC RBC-ENTMCNC: 30.3 PG — SIGNIFICANT CHANGE UP (ref 27–31)
MCHC RBC-ENTMCNC: 33.8 G/DL — SIGNIFICANT CHANGE UP (ref 32–37)
MCV RBC AUTO: 89.7 FL — SIGNIFICANT CHANGE UP (ref 80–94)
MONOCYTES # BLD AUTO: 0.6 K/UL — SIGNIFICANT CHANGE UP (ref 0.1–0.6)
MONOCYTES NFR BLD AUTO: 7 % — SIGNIFICANT CHANGE UP (ref 1.7–9.3)
NEUTROPHILS # BLD AUTO: 5.67 K/UL — SIGNIFICANT CHANGE UP (ref 1.4–6.5)
NEUTROPHILS NFR BLD AUTO: 65.9 % — SIGNIFICANT CHANGE UP (ref 42.2–75.2)
NRBC # BLD: 0 /100 WBCS — SIGNIFICANT CHANGE UP (ref 0–0)
PLATELET # BLD AUTO: 375 K/UL — SIGNIFICANT CHANGE UP (ref 130–400)
POTASSIUM SERPL-MCNC: 4.3 MMOL/L — SIGNIFICANT CHANGE UP (ref 3.5–5)
POTASSIUM SERPL-SCNC: 4.3 MMOL/L — SIGNIFICANT CHANGE UP (ref 3.5–5)
PROT SERPL-MCNC: 7.7 G/DL — SIGNIFICANT CHANGE UP (ref 6–8)
RBC # BLD: 4.65 M/UL — LOW (ref 4.7–6.1)
RBC # FLD: 13.7 % — SIGNIFICANT CHANGE UP (ref 11.5–14.5)
SODIUM SERPL-SCNC: 138 MMOL/L — SIGNIFICANT CHANGE UP (ref 135–146)
TROPONIN T SERPL-MCNC: <0.01 NG/ML — SIGNIFICANT CHANGE UP
WBC # BLD: 8.59 K/UL — SIGNIFICANT CHANGE UP (ref 4.8–10.8)
WBC # FLD AUTO: 8.59 K/UL — SIGNIFICANT CHANGE UP (ref 4.8–10.8)

## 2021-06-02 PROCEDURE — 70450 CT HEAD/BRAIN W/O DYE: CPT | Mod: 26,MA

## 2021-06-02 PROCEDURE — 99285 EMERGENCY DEPT VISIT HI MDM: CPT

## 2021-06-02 PROCEDURE — 93010 ELECTROCARDIOGRAM REPORT: CPT

## 2021-06-02 PROCEDURE — 71045 X-RAY EXAM CHEST 1 VIEW: CPT | Mod: 26

## 2021-06-02 RX ORDER — SODIUM CHLORIDE 9 MG/ML
1000 INJECTION INTRAMUSCULAR; INTRAVENOUS; SUBCUTANEOUS ONCE
Refills: 0 | Status: COMPLETED | OUTPATIENT
Start: 2021-06-02 | End: 2021-06-02

## 2021-06-02 RX ORDER — MECLIZINE HCL 12.5 MG
50 TABLET ORAL ONCE
Refills: 0 | Status: COMPLETED | OUTPATIENT
Start: 2021-06-02 | End: 2021-06-02

## 2021-06-02 RX ORDER — MECLIZINE HCL 12.5 MG
1 TABLET ORAL
Qty: 15 | Refills: 0
Start: 2021-06-02 | End: 2021-06-06

## 2021-06-02 RX ADMIN — Medication 50 MILLIGRAM(S): at 10:24

## 2021-06-02 RX ADMIN — SODIUM CHLORIDE 1000 MILLILITER(S): 9 INJECTION INTRAMUSCULAR; INTRAVENOUS; SUBCUTANEOUS at 10:26

## 2021-06-02 NOTE — ED PROVIDER NOTE - ATTENDING CONTRIBUTION TO CARE
59 yo m hx vertigo  pt states 10 days ago he stool up and felt his vertigo. mild associated lightheaded. no c/o gait/balance issues. no cp/sob. no new meds. pt does not have antivertigo meds. pt eating/drinking well. no trauma/head injuries.     vss  gen- NAD, aaox3  card-rrr  lungs-ctab, no wheezing or rhonchi  abd-sntnd, no guarding or rebound  neuro- full str/sensation, cn ii-xii grossly intact, normal coordination and gait, normal FTN    will get basic labs, ekg/trop, cth, will provide supportive care, serial exam and ED observation period  no fnd or cerebellar signs at this point

## 2021-06-02 NOTE — ED PROVIDER NOTE - CARE PROVIDER_API CALL
Isrrael Ocasio)  Neuromuscular Medicine  [address] [place] [address], [address]  [place], [place] [id number]  Phone: [phone]  Fax: [phone]  Follow Up Time:

## 2021-06-02 NOTE — ED PROVIDER NOTE - PHYSICAL EXAMINATION
CONST: NAD  EYES: PERRL, EOMI, Sclera and conjunctiva clear.   ENT: No nasal discharge. Oropharynx normal appearing, no erythema or exudates. No abscess or swelling. Uvula midline.   NECK: Non-tender, no meningeal signs. normal ROM. supple   CARD: S1 S2; No jvd  RESP: Equal BS B/L, No wheezes, rhonchi or rales. No distress  GI: Soft, non-tender, non-distended. no cva tenderness. normal BS  MS: Normal ROM in all extremities. pulses 2 +. no calf tenderness or swelling  SKIN: Warm, dry, no acute rashes. Good turgor  NEURO: A&Ox3, No focal deficits. Strength 5/5 with no sensory deficits. Steady gait. Finger to nose intact. Negative pronator drift

## 2021-06-02 NOTE — ED PROVIDER NOTE - PATIENT PORTAL LINK FT
You can access the FollowMyHealth Patient Portal offered by [facility] by registering at the following website: [email or web address]. By joining [employer]s FollowMyHealth portal, you will also be able to view your health information using other applications (apps) compatible with our system.

## 2021-06-02 NOTE — ED PROVIDER NOTE - CLINICAL SUMMARY MEDICAL DECISION MAKING FREE TEXT BOX
sx improving in er. labs unremarkable, ekg nsr, trop neg, cth neg, will dc w/ outpt f/u, strict return precautions

## 2021-06-02 NOTE — ED PROVIDER NOTE - NS ED ROS FT
Constitutional: (-) fever  Eyes/ENT: (-) blurry vision, (-) epistaxis  Cardiovascular: (-) chest pain, (-) syncope  Respiratory: (-) cough, (-) shortness of breath  Gastrointestinal: (-) vomiting, (-) diarrhea  : (-) dysuria, (-) hematuria  Musculoskeletal: (-) neck pain, (-) back pain, (-) joint pain  Integumentary: (-) rash, (-) edema  Neurological: (+) dizziness, (-) headache, (-) altered mental status  Allergic/Immunologic: (-) pruritus

## 2021-06-02 NOTE — ED PROVIDER NOTE - NSFOLLOWUPINSTRUCTIONS_ED_ALL_ED_FT
Follow up with PMD and Neurology in 1-2 days.    Dizziness    Dizziness can manifest as a feeling of unsteadiness or light-headedness. You may feel like you are about to faint. This condition can be caused by a number of things, including medicines, dehydration, or illness. Drink enough fluid to keep your urine clear or pale yellow. Do not drink alcohol and limit your caffeine intake. Avoid quick or sudden movements.  Rise slowly from chairs and steady yourself until you feel okay. In the morning, first sit up on the side of the bed.    SEEK IMMEDIATE MEDICAL CARE IF YOU HAVE ANY OF THE FOLLOWING SYMPTOMS: vomiting, changes in your vision or speech, weakness in your arms or legs, trouble speaking or swallowing, chest pain, abdominal pain, shortness of breath, sweating, bleeding, headache, neck pain, or fever.

## 2021-06-02 NOTE — ED PROVIDER NOTE - OBJECTIVE STATEMENT
60y M pmh Vertigo presents for eval of dizziness. Pt has intermittent room spinning dizziness x10 days, aggravated with change of head position, relieved at rest. Denies fever, ha, cp, sob, weakness, numbness, trauma

## 2021-06-02 NOTE — ED ADULT NURSE NOTE - NSIMPLEMENTINTERV_GEN_ALL_ED
Message left for patient Health form, along with Lippd/Gucose results, wt. Ht. BP sent to patient.    Implemented All Universal Safety Interventions:  [place] to call system. Call bell, personal items and telephone within reach. Instruct patient to call for assistance. Room bathroom lighting operational. Non-slip footwear when patient is off stretcher. Physically safe environment: no spills, clutter or unnecessary equipment. Stretcher in lowest position, wheels locked, appropriate side rails in place.

## 2021-06-02 NOTE — ED ADULT TRIAGE NOTE - CCCP TRG CHIEF CMPLNT
Labs are all good.  Urine test is also normal.  Please arrange for CT abdomen and pelvis at [facility]. order has been placed
dizziness

## 2021-09-06 ENCOUNTER — INPATIENT (INPATIENT)
Facility: HOSPITAL | Age: 61
LOS: 0 days | Discharge: HOME | End: 2021-09-07
Attending: INTERNAL MEDICINE | Admitting: INTERNAL MEDICINE
Payer: MEDICAID

## 2021-09-06 VITALS
HEART RATE: 62 BPM | TEMPERATURE: 98 F | SYSTOLIC BLOOD PRESSURE: 171 MMHG | HEIGHT: 70 IN | OXYGEN SATURATION: 97 % | RESPIRATION RATE: 24 BRPM | DIASTOLIC BLOOD PRESSURE: 76 MMHG

## 2021-09-06 LAB
ALBUMIN SERPL ELPH-MCNC: 4.5 G/DL — SIGNIFICANT CHANGE UP (ref 3.5–5.2)
ALP SERPL-CCNC: 93 U/L — SIGNIFICANT CHANGE UP (ref 30–115)
ALT FLD-CCNC: 19 U/L — SIGNIFICANT CHANGE UP (ref 0–41)
ANION GAP SERPL CALC-SCNC: 14 MMOL/L — SIGNIFICANT CHANGE UP (ref 7–14)
AST SERPL-CCNC: 21 U/L — SIGNIFICANT CHANGE UP (ref 0–41)
BASOPHILS # BLD AUTO: 0.09 K/UL — SIGNIFICANT CHANGE UP (ref 0–0.2)
BASOPHILS NFR BLD AUTO: 1.2 % — HIGH (ref 0–1)
BILIRUB SERPL-MCNC: <0.2 MG/DL — SIGNIFICANT CHANGE UP (ref 0.2–1.2)
BUN SERPL-MCNC: 12 MG/DL — SIGNIFICANT CHANGE UP (ref 10–20)
CALCIUM SERPL-MCNC: 9 MG/DL — SIGNIFICANT CHANGE UP (ref 8.5–10.1)
CHLORIDE SERPL-SCNC: 103 MMOL/L — SIGNIFICANT CHANGE UP (ref 98–110)
CO2 SERPL-SCNC: 20 MMOL/L — SIGNIFICANT CHANGE UP (ref 17–32)
CREAT SERPL-MCNC: 1.2 MG/DL — SIGNIFICANT CHANGE UP (ref 0.7–1.5)
EOSINOPHIL # BLD AUTO: 0.14 K/UL — SIGNIFICANT CHANGE UP (ref 0–0.7)
EOSINOPHIL NFR BLD AUTO: 1.8 % — SIGNIFICANT CHANGE UP (ref 0–8)
GLUCOSE SERPL-MCNC: 103 MG/DL — HIGH (ref 70–99)
HCT VFR BLD CALC: 36.8 % — LOW (ref 42–52)
HGB BLD-MCNC: 12.2 G/DL — LOW (ref 14–18)
IMM GRANULOCYTES NFR BLD AUTO: 0.6 % — HIGH (ref 0.1–0.3)
LYMPHOCYTES # BLD AUTO: 1.96 K/UL — SIGNIFICANT CHANGE UP (ref 1.2–3.4)
LYMPHOCYTES # BLD AUTO: 25.2 % — SIGNIFICANT CHANGE UP (ref 20.5–51.1)
MAGNESIUM SERPL-MCNC: 1.9 MG/DL — SIGNIFICANT CHANGE UP (ref 1.8–2.4)
MCHC RBC-ENTMCNC: 29.9 PG — SIGNIFICANT CHANGE UP (ref 27–31)
MCHC RBC-ENTMCNC: 33.2 G/DL — SIGNIFICANT CHANGE UP (ref 32–37)
MCV RBC AUTO: 90.2 FL — SIGNIFICANT CHANGE UP (ref 80–94)
MONOCYTES # BLD AUTO: 0.42 K/UL — SIGNIFICANT CHANGE UP (ref 0.1–0.6)
MONOCYTES NFR BLD AUTO: 5.4 % — SIGNIFICANT CHANGE UP (ref 1.7–9.3)
NEUTROPHILS # BLD AUTO: 5.13 K/UL — SIGNIFICANT CHANGE UP (ref 1.4–6.5)
NEUTROPHILS NFR BLD AUTO: 65.8 % — SIGNIFICANT CHANGE UP (ref 42.2–75.2)
NRBC # BLD: 0 /100 WBCS — SIGNIFICANT CHANGE UP (ref 0–0)
NT-PROBNP SERPL-SCNC: 18 PG/ML — SIGNIFICANT CHANGE UP (ref 0–300)
PLATELET # BLD AUTO: 377 K/UL — SIGNIFICANT CHANGE UP (ref 130–400)
POTASSIUM SERPL-MCNC: 4.3 MMOL/L — SIGNIFICANT CHANGE UP (ref 3.5–5)
POTASSIUM SERPL-SCNC: 4.3 MMOL/L — SIGNIFICANT CHANGE UP (ref 3.5–5)
PROT SERPL-MCNC: 7.2 G/DL — SIGNIFICANT CHANGE UP (ref 6–8)
RBC # BLD: 4.08 M/UL — LOW (ref 4.7–6.1)
RBC # FLD: 13.8 % — SIGNIFICANT CHANGE UP (ref 11.5–14.5)
SARS-COV-2 RNA SPEC QL NAA+PROBE: SIGNIFICANT CHANGE UP
SODIUM SERPL-SCNC: 137 MMOL/L — SIGNIFICANT CHANGE UP (ref 135–146)
TROPONIN T SERPL-MCNC: <0.01 NG/ML — SIGNIFICANT CHANGE UP
WBC # BLD: 7.79 K/UL — SIGNIFICANT CHANGE UP (ref 4.8–10.8)
WBC # FLD AUTO: 7.79 K/UL — SIGNIFICANT CHANGE UP (ref 4.8–10.8)

## 2021-09-06 PROCEDURE — 93010 ELECTROCARDIOGRAM REPORT: CPT

## 2021-09-06 PROCEDURE — 93010 ELECTROCARDIOGRAM REPORT: CPT | Mod: 77

## 2021-09-06 PROCEDURE — 99223 1ST HOSP IP/OBS HIGH 75: CPT | Mod: 25

## 2021-09-06 PROCEDURE — 99406 BEHAV CHNG SMOKING 3-10 MIN: CPT

## 2021-09-06 PROCEDURE — 71045 X-RAY EXAM CHEST 1 VIEW: CPT | Mod: 26

## 2021-09-06 PROCEDURE — 99285 EMERGENCY DEPT VISIT HI MDM: CPT

## 2021-09-06 RX ORDER — ASPIRIN/CALCIUM CARB/MAGNESIUM 324 MG
324 TABLET ORAL ONCE
Refills: 0 | Status: COMPLETED | OUTPATIENT
Start: 2021-09-06 | End: 2021-09-06

## 2021-09-06 RX ORDER — ENOXAPARIN SODIUM 100 MG/ML
40 INJECTION SUBCUTANEOUS AT BEDTIME
Refills: 0 | Status: DISCONTINUED | OUTPATIENT
Start: 2021-09-06 | End: 2021-09-07

## 2021-09-06 RX ORDER — ASPIRIN/CALCIUM CARB/MAGNESIUM 324 MG
1 TABLET ORAL
Qty: 0 | Refills: 0 | DISCHARGE

## 2021-09-06 RX ORDER — ASPIRIN/CALCIUM CARB/MAGNESIUM 324 MG
81 TABLET ORAL DAILY
Refills: 0 | Status: DISCONTINUED | OUTPATIENT
Start: 2021-09-07 | End: 2021-09-07

## 2021-09-06 RX ADMIN — Medication 324 MILLIGRAM(S): at 13:41

## 2021-09-06 RX ADMIN — ENOXAPARIN SODIUM 40 MILLIGRAM(S): 100 INJECTION SUBCUTANEOUS at 21:30

## 2021-09-06 NOTE — H&P ADULT - NSHPLABSRESULTS_GEN_ALL_CORE
12.2   7.79  )-----------( 377      ( 06 Sep 2021 14:00 )             36.8       09-06    137  |  103  |  12  ----------------------------<  103<H>  4.3   |  20  |  1.2    Ca    9.0      06 Sep 2021 14:00  Mg     1.9     09-06    TPro  7.2  /  Alb  4.5  /  TBili  <0.2  /  DBili  x   /  AST  21  /  ALT  19  /  AlkPhos  93  09-06        CARDIAC MARKERS ( 06 Sep 2021 14:00 )  x     / <0.01 ng/mL / x     / x     / x        EKG: T wave inversions inferolateral, ST depression     CXR: No acute cardiopulmonary disease

## 2021-09-06 NOTE — H&P ADULT - NSHPPHYSICALEXAM_GEN_ALL_CORE
T(C): 36.7 (09-06-21 @ 12:34), Max: 36.7 (09-06-21 @ 12:34)  HR: 62 (09-06-21 @ 12:34) (62 - 62)  BP: 171/76 (09-06-21 @ 12:34) (171/76 - 171/76)  RR: 24 (09-06-21 @ 12:34) (24 - 24)  SpO2: 97% (09-06-21 @ 12:34) (97% - 97%)    PHYSICAL EXAM:  GENERAL: NAD, well-developed, appearing stated age, conversive   HEAD:  Atraumatic, Normocephalic  EYES: EOMI, PERRLA, conjunctiva and sclera clear  ENT:No nasal obstruction or discharge. No tonsillar exudate, swelling or erythema.  NECK: Supple, No JVD  CHEST/LUNG: Clear to auscultation bilaterally; No wheeze, rhonchi, rales. Diminished breath sounds bilaterally with prolonged expiratory phase   HEART: Regular rate and rhythm; No murmurs, rubs, or gallops  ABDOMEN: Soft, Nontender, Nondistended; Bowel sounds present  EXTREMITIES:  2+ Peripheral Pulses, No clubbing, cyanosis, or edema  PSYCH: AAOx3  NEUROLOGY: non-focal, sensation equal and intact bilaterally, cranial nerves ii-xii grossly intact, muscle strength 5/5 bilaterally.   SKIN: No rashes or lesions

## 2021-09-06 NOTE — ED PROVIDER NOTE - NS ED ROS FT
Eyes:  No visual changes, eye pain or discharge.  ENMT:  No hearing changes, pain, discharge or infections. No neck pain or stiffness.  Cardiac:  No chest pain or edema. No chest pain with exertion. +palpitations.  Respiratory:  No cough. +sob. No hemoptysis. No history of asthma or RAD.  GI:  No nausea, vomiting, diarrhea, or abdominal pain.  MS:  No myalgia, muscle weakness, joint pain, or back pain.  Neuro:  No headache or weakness.  No LOC.

## 2021-09-06 NOTE — ED SUB INTERN NOTE - OBJECTIVE STATEMENT FT
The pt is a 61 y/m with a past medical history of vertigo and arthritis presents for evaluation of SOB. The pt indicated that he started having sob and palpitations this morning after he consumed his B12 supplement and 2 cups of coffee. The usually drinks 3 cups of coffee in one sitting but after 2 cups today he started having his symptoms. The pt does not use oxygen at home and has not had chest pain today. The pt has a cardiologist and his last stress test was 4 years ago.

## 2021-09-06 NOTE — CONSULT NOTE ADULT - SUBJECTIVE AND OBJECTIVE BOX
HISTORY OF PRESENT ILLNESS:   60 yo M no reported cardiac hx presented with complaint of shortness of breath and palpitations starting on the day of presentation. The patient reports that he woke up this morning early around 5:30am after a poor night's sleep to cook for the holiday. States that he normally does not drink much coffee but he was tired so he drank two cups of caffeinated Folger's coffee with his B12 OTC pills he takes. Around 1-2 hours after drinking the coffee he developed some palpitations that were associated with shortness of breath. States that he went to take a shower and it progressed prompting him to come to the ED for evaluation. States he continued to feel short of breath with resolution occurring after being placed on supplemental Oxygen. Patient otherwise denied AQUINO, chest pain or lightheadedness.    PAST MEDICAL & SURGICAL HISTORY  Osteoarthritis  Vertigo  No significant past surgical history    FAMILY HISTORY:  FAMILY HISTORY:  No pertinent family history in first degree relatives    SOCIAL HISTORY:  [x]smoker half a pack for 30 years  []Alcohol  []Drug    ROS:  Negative except as mentioned in HPI    ALLERGIES:  Tylenol (Unknown)      MEDICATIONS:  MEDICATIONS  (STANDING):  enoxaparin Injectable 40 milliGRAM(s) SubCutaneous at bedtime    MEDICATIONS  (PRN):      HOME MEDICATIONS:  Home Medications:  aspirin 81 mg oral delayed release tablet: 1 tab(s) orally once a day (06 Sep 2021 16:32)  Vitamin B-12 1000 mcg oral tablet: 1 tab(s) orally once a day (06 Sep 2021 16:32)      VITALS:   T(F): 96.8 (09-06 @ 20:40), Max: 98 (09-06 @ 12:34)  HR: 73 (09-06 @ 20:40) (62 - 73)  BP: 141/63 (09-06 @ 20:40) (141/63 - 171/76)  BP(mean): --  RR: 18 (09-06 @ 20:40) (18 - 24)  SpO2: 97% (09-06 @ 12:34) (97% - 97%)    I&O's Summary      PHYSICAL EXAM:  GEN: Not in acute distress  HEENT: NCAT, PERRL, EOMI  LUNGS: Clear to auscultation bilaterally   CARDIOVASCULAR: RRR, S1/S2 present, no murmurs, rubs or gallops, no JVD, + PP bilaterally  ABD: Soft, non-tender, non-distended  EXT: No AILYN  SKIN: Intact  NEURO: AAOx3    LABS:                        12.2   7.79  )-----------( 377      ( 06 Sep 2021 14:00 )             36.8     09-06    137  |  103  |  12  ----------------------------<  103<H>  4.3   |  20  |  1.2    Ca    9.0      06 Sep 2021 14:00  Mg     1.9     09-06    TPro  7.2  /  Alb  4.5  /  TBili  <0.2  /  DBili  x   /  AST  21  /  ALT  19  /  AlkPhos  93  09-06      Troponin T, Serum: <0.01 ng/mL (09-06-21 @ 15:55)  Troponin T, Serum: <0.01 ng/mL (09-06-21 @ 14:00)    Troponin <0.01, CKMB --, CK --/ 09-06-21 @ 15:55  Troponin <0.01, CKMB --, CK --/ 09-06-21 @ 14:00    Serum Pro-Brain Natriuretic Peptide: 18 pg/mL (09-06-21 @ 14:00)    CXR: clear    EKG: NSR and LVH, 2nd EKG with TWI in infero-lateral leads new onset. (Patient remains symptoms free) HISTORY OF PRESENT ILLNESS:   60 yo M no reported cardiac hx prese  nted with complaint of shortness of breath and palpitations starting on the day of presentation. The patient reports that he woke up this morning early around 5:30am after a poor night's sleep to cook for the holiday. States that he normally does not drink much coffee but he was tired so he drank two cups of caffeinated Folger's coffee with his B12 OTC pills he takes. Around 1-2 hours after drinking the coffee he developed some palpitations that were associated with shortness of breath. States that he went to take a shower and it progressed prompting him to come to the ED for evaluation. States he continued to feel short of breath with resolution occurring after being placed on supplemental Oxygen. Patient otherwise denied AQUINO, chest pain or lightheadedness.    PAST MEDICAL & SURGICAL HISTORY  Osteoarthritis  Vertigo  No significant past surgical history    FAMILY HISTORY:  FAMILY HISTORY:  No pertinent family history in first degree relatives    SOCIAL HISTORY:  [x]smoker half a pack for 30 years  []Alcohol  []Drug    ROS:  Negative except as mentioned in HPI    ALLERGIES:  Tylenol (Unknown)      MEDICATIONS:  MEDICATIONS  (STANDING):  enoxaparin Injectable 40 milliGRAM(s) SubCutaneous at bedtime    MEDICATIONS  (PRN):      HOME MEDICATIONS:  Home Medications:  aspirin 81 mg oral delayed release tablet: 1 tab(s) orally once a day (06 Sep 2021 16:32)  Vitamin B-12 1000 mcg oral tablet: 1 tab(s) orally once a day (06 Sep 2021 16:32)      VITALS:   T(F): 96.8 (09-06 @ 20:40), Max: 98 (09-06 @ 12:34)  HR: 73 (09-06 @ 20:40) (62 - 73)  BP: 141/63 (09-06 @ 20:40) (141/63 - 171/76)  BP(mean): --  RR: 18 (09-06 @ 20:40) (18 - 24)  SpO2: 97% (09-06 @ 12:34) (97% - 97%)    I&O's Summary      PHYSICAL EXAM:  GEN: Not in acute distress  HEENT: NCAT, PERRL, EOMI  LUNGS: Clear to auscultation bilaterally   CARDIOVASCULAR: RRR, S1/S2 present, no murmurs, rubs or gallops, no JVD, + PP bilaterally  ABD: Soft, non-tender, non-distended  EXT: No AILYN  SKIN: Intact  NEURO: AAOx3    LABS:                        12.2   7.79  )-----------( 377      ( 06 Sep 2021 14:00 )             36.8     09-06    137  |  103  |  12  ----------------------------<  103<H>  4.3   |  20  |  1.2    Ca    9.0      06 Sep 2021 14:00  Mg     1.9     09-06    TPro  7.2  /  Alb  4.5  /  TBili  <0.2  /  DBili  x   /  AST  21  /  ALT  19  /  AlkPhos  93  09-06      Troponin T, Serum: <0.01 ng/mL (09-06-21 @ 15:55)  Troponin T, Serum: <0.01 ng/mL (09-06-21 @ 14:00)    Troponin <0.01, CKMB --, CK --/ 09-06-21 @ 15:55  Troponin <0.01, CKMB --, CK --/ 09-06-21 @ 14:00    Serum Pro-Brain Natriuretic Peptide: 18 pg/mL (09-06-21 @ 14:00)    CXR: clear    EKG: NSR and LVH, 2nd EKG with TWI in infero-lateral leads new onset. (Patient remains symptoms free)

## 2021-09-06 NOTE — ED SUB INTERN NOTE - ENMT, MLM
Airway patent. Nasal mucosa clear. Mouth with normal mucosa. Throat has no vesicles, no oropharyngeal exudates and uvula is midline. The pt has poor dentition.

## 2021-09-06 NOTE — CONSULT NOTE ADULT - ASSESSMENT
60 yo M no reported cardiac hx presented with complaint of shortness of breath and palpitations. Cardiology consulted for EKG abnormalities.    New onset TWI in infero-lateral leads on EKG    - Palpitation likely related to caffeine use. Monitor on tele.  - Obtain official TTE in the am.  - Check TSH, lipid profile, HbA1c  - EKG changes unlikely representing active ischemia as patient remains symptoms free and CE negative. Will repeat EKG.  - Given multiple risk factors and clinical presentation, will consider ischemic w/u eventually.  - Will discuss plan with attending cardiologist. 62 yo M no reported cardiac hx presented with complaint of shortness of breath and palpitations. Cardiology consulted for EKG abnormalities.    New onset TWI in infero-lateral leads on EKG    - Palpitation likely related to caffeine use. Monitor on tele.  - Obtain official TTE in the am.  - Check TSH, lipid profile, HbA1c  - EKG changes unlikely representing active ischemia as patient remains symptoms free and CE negative. Will repeat EKG.  - Given multiple risk factors and clinical presentation, will need ischemic w/u - get Lexiscan Nuclear stress test today.

## 2021-09-06 NOTE — H&P ADULT - NSHPREVIEWOFSYSTEMS_GEN_ALL_CORE
General: No fevers, chills, weight changes	  Skin/Breast: No skin rashes or wounds  Ophthalmologic: No blurry vision, double vision, recent changes in vision  ENMT: No difficulty hearing, ringing in ears, nasal discharge, throat pain, difficulty swallowing  Respiratory and Thorax: No coughing, wheezing, shortness of breath  Cardiovascular: No chest pain, + palpitations  Gastrointestinal: No abdominal pain, constipation, diarrhea, nausea, vomiting  Genitourinary: No dysuria, polyuria, pyuria, hematuria  Musculoskeletal: No muscle aches or joint aches  Neurological: No numbness or tingling  Psychiatric: Regular mood  Hematology/Lymphatics: No easy bruising	  Endocrine: No hot or cold intolerance

## 2021-09-06 NOTE — ED PROVIDER NOTE - OBJECTIVE STATEMENT
The pt is a 61 y/m with a past medical history of vertigo and arthritis presents for evaluation of SOB. The pt indicated that he started having sob and palpitations this morning after he consumed his B12 supplement and 2 cups of coffee. The usually drinks 3 cups of coffee in one sitting but after 2 cups today he started having his symptoms. The pt does not use oxygen at home and has not had chest pain today. The pt has a cardiologist and his last stress test was 4 years ago. The pt is a 61 y/m with a past medical history of vertigo and arthritis presents for evaluation of SOB. The pt indicated that he started having sob and palpitations this morning after he consumed his B12 supplement and 2 cups of coffee. The usually drinks 3 cups of coffee in one sitting but after 2 cups today he started having his symptoms. The pt does not use oxygen at home and has not had chest pain today. The pt has a cardiologist; last stress test was 4 years ago.

## 2021-09-06 NOTE — H&P ADULT - ASSESSMENT
This is a 61 year old male with PMHx of Osteoarthritis and Vertigo who presented to the ED with complaint of shortness of breath and palpitations starting on the day of presentation.    #SOB  #Palpitations  - Admit to telemetry  - Rule out ACS; Suspect caffeine and B12 induced. Would advise limiting the intake of both moving forward   - Check TSH, A1c, Lipid profile  - EKG changes noted; repeat EKG   - Troponin negative x1; trend troponin  - TTE ordered      #Tobacco Abuse  - Counseled on the importance of smoking cessation; patient actively working on quitting down to 3-4 cigarettes a day from 8 a day.     Activity: As tolerated  Diet: DASH  DVT ppx: Lovenox  GI ppx: Not indicated  Code Status: Full Code  DISPO: Acute  This is a 61 year old male with PMHx of Osteoarthritis and Vertigo who presented to the ED with complaint of shortness of breath and palpitations starting on the day of presentation.    #SOB  #Palpitations  - Admit to telemetry  - Rule out ACS; Suspect caffeine and B12 induced. Would advise limiting the intake of both moving forward   - Check TSH, A1c, Lipid profile  - EKG changes noted; repeat EKG   - Troponin negative x1; trend troponin  - TTE ordered  - Cardiology fellow called and aware of the patient. Will evaluate     #Tobacco Abuse  - Counseled on the importance of smoking cessation; patient actively working on quitting down to 3-4 cigarettes a day from 8 a day.     Activity: As tolerated  Diet: DASH  DVT ppx: Lovenox  GI ppx: Not indicated  Code Status: Full Code  DISPO: Acute

## 2021-09-06 NOTE — H&P ADULT - ATTENDING COMMENTS
60 YO M with a PMH of OA and vertigo who presents to the hospital with a c/o SOB for the past x 1 day. Associated with palpitations. Did not take SLN or ASA prior to arrival. Denies any fevers/chills, cough, ABD pain, LE swelling, dysuria, headaches, or rashes.     In the ED, cardiac enzymes were negative and an EKG showed no ischemic changes. ASA given in the ED.     Family hx of early heart disease (-)     Physical exam shows pt in NAD, resting comfortably. VSS, afebrile, not hypoxic on RA. A&Ox3. Neuro exam intact. CTA B/L with no W/C/R. RRR, no M/G/R. ABD is soft and non-tender to palpation, normoactive BSs. LEs without swelling, pulses palpated bilaterally. No rashes. Labs and imaging as above resident note.     Palpitations + SOB, rule out ACS. Admit to tele. Cardio consult. Serial cardiac enzymes and EKGs. A1c, Lipids, and TSH. ASA given in the ED. Echo. PRN pain meds. Restart home meds.   -Pt with TWIs on the second EKG in the infero-lateral leads.     HX of OA and vertigo. Restart home meds, except as stated above. DVT PPX. Inform PCP of pt's admission to hospital. My note supersedes the residents note. 62 YO M with a PMH of OA and vertigo who presents to the hospital with a c/o SOB for the past x 1 day. Associated with palpitations. Did not take SLN or ASA prior to arrival. Denies any fevers/chills, cough, ABD pain, LE swelling, dysuria, headaches, or rashes.     Of note, the pt took 2 tabs of B12 and 2 cups of folgers regular coffee with caffeine prior to symptoms starting.     In the ED, cardiac enzymes were negative and an EKG showed no ischemic changes. ASA given in the ED.     Family hx of early heart disease (-)     Physical exam shows pt in NAD, resting comfortably. VSS, afebrile, not hypoxic on RA. A&Ox3. Neuro exam intact. CTA B/L with no W/C/R. RRR, no M/G/R. ABD is soft and non-tender to palpation, normoactive BSs. LEs without swelling, pulses palpated bilaterally. No rashes. Labs and imaging as above resident note.     Palpitations + SOB, rule out ACS. Admit to tele. Cardio consult. Serial cardiac enzymes and EKGs. A1c, Lipids, and TSH. ASA given in the ED. Echo. PRN pain meds. Restart home meds.   -Pt with TWIs on the second EKG in the infero-lateral leads.     HX of OA and vertigo. Restart home meds, except as stated above. DVT PPX. Inform PCP of pt's admission to hospital. My note supersedes the residents note. 62 YO M with a PMH of OA and vertigo who presents to the hospital with a c/o SOB for the past x 1 day. Associated with palpitations. Did not take SLN or ASA prior to arrival. Denies any fevers/chills, cough, ABD pain, LE swelling, dysuria, headaches, or rashes.     Of note, the pt took 2 tabs of B12 and 2 cups of folgers regular coffee with caffeine prior to symptoms starting.     In the ED, cardiac enzymes were negative and an EKG showed no ischemic changes. ASA given in the ED.     Family hx of early heart disease (-)     Physical exam shows pt in NAD, resting comfortably. VSS, afebrile, not hypoxic on RA. A&Ox3. Neuro exam intact. CTA B/L with no W/C/R. RRR, no M/G/R. ABD is soft and non-tender to palpation, normoactive BSs. LEs without swelling, pulses palpated bilaterally. No rashes. Labs and imaging as above resident note.     Palpitations + SOB, rule out ACS. Admit to tele. Cardio consult. Serial cardiac enzymes and EKGs. A1c, Lipids, and TSH. ASA given in the ED. Echo. PRN pain meds. Restart home meds.   -Pt with TWIs on the second EKG in the infero-lateral leads.     Tobacco abuse with counseling. Pt extensively counseled on the benefits of smoking cessation and alternative therapies. Counseled for 15-20 minutes. Pt would like to think about their options prior to making a decision.     HX of OA and vertigo. Restart home meds, except as stated above. DVT PPX. Inform PCP of pt's admission to hospital. My note supersedes the residents note.

## 2021-09-06 NOTE — CONSULT NOTE ADULT - ATTENDING COMMENTS
ECG and labs reviewed  Normal troponin and BNP  R/o ischemia  F/u TSH  2D echo today.  Schedule for nuclear stress test today.  Further recommendations after these results are back.

## 2021-09-06 NOTE — ED PROVIDER NOTE - CLINICAL SUMMARY MEDICAL DECISION MAKING FREE TEXT BOX
62yo M presenting with shortness of breath palpitations since this AM. no cardiac history. no dvt/pe risk factors. Well appearing, NAD, non toxic. NCAT PERRLA EOMI neck supple non tender normal wob cta bl rrr abdomen s nt nd no rebound no guarding [id number] neuro non focal. labs ekg imaging reviewed. pt with dynamic ekg changes. no active chest pain. dw cardiology. will admit for further eval.

## 2021-09-06 NOTE — ED PROVIDER NOTE - PHYSICAL EXAMINATION
CONSTITUTIONAL: Well-developed; well-nourished; in no acute distress.   SKIN: warm, dry.  HEAD: Normocephalic; atraumatic.  EYES: PERRL, EOMI, no conjunctival erythema  ENT: No nasal discharge; airway clear. MMM.  NECK: Supple; non tender.  CARD: S1, S2 normal; no murmurs, gallops, or rubs. Regular rate and rhythm.   RESP: No wheezes, rales or rhonchi. Not in respiratory distress. No stridor.  ABD: soft ntnd; no masses, rebound, or guarding.  EXT: Normal ROM.  No clubbing, cyanosis, or edema.  NEURO: Alert, oriented, grossly unremarkable. No focal neuro. deficits.  PSYCH: Cooperative, appropriate.

## 2021-09-06 NOTE — H&P ADULT - HISTORY OF PRESENT ILLNESS
This is a 61 year old male with PMHx of Osteoarthritis and Vertigo who presented to the ED with complaint of shortness of breath and palpitations starting on the day of presentation. The patient reports that he woke up this morning early around 5:30am after a poor night's sleep to cook for the holiday. States that he normally does not drink much coffee but he was tired so he drank two cups of caffeinated Folger's coffee with his B12 OTC pills he takes. Around 1-2 hours after drinking the coffee he developed some palpitations that were associated with shortness of breath. STates that he went to take a shower and it progressed prompting him to come to the ED for evaluation. States he continued to feel short of breath with resolution occurring after being placed on supplemental Oxygen. He is an active smoker with no family history of any heart disease.     In the ED initial vitals: /76, HR 62, T98, Sat 97% on room air. Placed on 2L NC in the ED. Initial labs all negative with troponin and BNP negative, CXR negative with EKG initially NSR. Repeat EKG noted T wave inversion in inferolateral leads with some ST depressions. Admitted to telemetry for further workup and monitoring. He was loaded with ASA in the ED.

## 2021-09-06 NOTE — ED ADULT NURSE NOTE - OBJECTIVE STATEMENT
61 year old male states he took double the dose of vitamin b12 this morning and two cups of coffee. patient states he was home when he felt short of breath .patient denies any cpain

## 2021-09-07 VITALS — OXYGEN SATURATION: 96 %

## 2021-09-07 LAB
A1C WITH ESTIMATED AVERAGE GLUCOSE RESULT: 5.8 % — HIGH (ref 4–5.6)
ANION GAP SERPL CALC-SCNC: 13 MMOL/L — SIGNIFICANT CHANGE UP (ref 7–14)
BASOPHILS # BLD AUTO: 0.07 K/UL — SIGNIFICANT CHANGE UP (ref 0–0.2)
BASOPHILS NFR BLD AUTO: 1.1 % — HIGH (ref 0–1)
BUN SERPL-MCNC: 13 MG/DL — SIGNIFICANT CHANGE UP (ref 10–20)
CALCIUM SERPL-MCNC: 8.7 MG/DL — SIGNIFICANT CHANGE UP (ref 8.5–10.1)
CHLORIDE SERPL-SCNC: 106 MMOL/L — SIGNIFICANT CHANGE UP (ref 98–110)
CHOLEST SERPL-MCNC: 197 MG/DL — SIGNIFICANT CHANGE UP
CO2 SERPL-SCNC: 20 MMOL/L — SIGNIFICANT CHANGE UP (ref 17–32)
COVID-19 SPIKE DOMAIN AB INTERP: NEGATIVE — SIGNIFICANT CHANGE UP
COVID-19 SPIKE DOMAIN ANTIBODY RESULT: 0.4 U/ML — SIGNIFICANT CHANGE UP
CREAT SERPL-MCNC: 1 MG/DL — SIGNIFICANT CHANGE UP (ref 0.7–1.5)
EOSINOPHIL # BLD AUTO: 0.33 K/UL — SIGNIFICANT CHANGE UP (ref 0–0.7)
EOSINOPHIL NFR BLD AUTO: 5.4 % — SIGNIFICANT CHANGE UP (ref 0–8)
ESTIMATED AVERAGE GLUCOSE: 120 MG/DL — HIGH (ref 68–114)
GLUCOSE SERPL-MCNC: 91 MG/DL — SIGNIFICANT CHANGE UP (ref 70–99)
HCT VFR BLD CALC: 39.2 % — LOW (ref 42–52)
HDLC SERPL-MCNC: 32 MG/DL — LOW
HGB BLD-MCNC: 13 G/DL — LOW (ref 14–18)
IMM GRANULOCYTES NFR BLD AUTO: 0.3 % — SIGNIFICANT CHANGE UP (ref 0.1–0.3)
LIPID PNL WITH DIRECT LDL SERPL: 135 MG/DL — HIGH
LYMPHOCYTES # BLD AUTO: 2.46 K/UL — SIGNIFICANT CHANGE UP (ref 1.2–3.4)
LYMPHOCYTES # BLD AUTO: 40.3 % — SIGNIFICANT CHANGE UP (ref 20.5–51.1)
MAGNESIUM SERPL-MCNC: 2 MG/DL — SIGNIFICANT CHANGE UP (ref 1.8–2.4)
MCHC RBC-ENTMCNC: 30.8 PG — SIGNIFICANT CHANGE UP (ref 27–31)
MCHC RBC-ENTMCNC: 33.2 G/DL — SIGNIFICANT CHANGE UP (ref 32–37)
MCV RBC AUTO: 92.9 FL — SIGNIFICANT CHANGE UP (ref 80–94)
MONOCYTES # BLD AUTO: 0.44 K/UL — SIGNIFICANT CHANGE UP (ref 0.1–0.6)
MONOCYTES NFR BLD AUTO: 7.2 % — SIGNIFICANT CHANGE UP (ref 1.7–9.3)
NEUTROPHILS # BLD AUTO: 2.78 K/UL — SIGNIFICANT CHANGE UP (ref 1.4–6.5)
NEUTROPHILS NFR BLD AUTO: 45.7 % — SIGNIFICANT CHANGE UP (ref 42.2–75.2)
NON HDL CHOLESTEROL: 165 MG/DL — HIGH
NRBC # BLD: 0 /100 WBCS — SIGNIFICANT CHANGE UP (ref 0–0)
PLATELET # BLD AUTO: 348 K/UL — SIGNIFICANT CHANGE UP (ref 130–400)
POTASSIUM SERPL-MCNC: 4.4 MMOL/L — SIGNIFICANT CHANGE UP (ref 3.5–5)
POTASSIUM SERPL-SCNC: 4.4 MMOL/L — SIGNIFICANT CHANGE UP (ref 3.5–5)
RBC # BLD: 4.22 M/UL — LOW (ref 4.7–6.1)
RBC # FLD: 13.9 % — SIGNIFICANT CHANGE UP (ref 11.5–14.5)
SARS-COV-2 IGG+IGM SERPL QL IA: 0.4 U/ML — SIGNIFICANT CHANGE UP
SARS-COV-2 IGG+IGM SERPL QL IA: NEGATIVE — SIGNIFICANT CHANGE UP
SODIUM SERPL-SCNC: 139 MMOL/L — SIGNIFICANT CHANGE UP (ref 135–146)
TRIGL SERPL-MCNC: 164 MG/DL — HIGH
TROPONIN T SERPL-MCNC: <0.01 NG/ML — SIGNIFICANT CHANGE UP
TSH SERPL-MCNC: 1.67 UIU/ML — SIGNIFICANT CHANGE UP (ref 0.27–4.2)
WBC # BLD: 6.1 K/UL — SIGNIFICANT CHANGE UP (ref 4.8–10.8)
WBC # FLD AUTO: 6.1 K/UL — SIGNIFICANT CHANGE UP (ref 4.8–10.8)

## 2021-09-07 PROCEDURE — 93306 TTE W/DOPPLER COMPLETE: CPT | Mod: 26

## 2021-09-07 PROCEDURE — 99222 1ST HOSP IP/OBS MODERATE 55: CPT

## 2021-09-07 PROCEDURE — 99239 HOSP IP/OBS DSCHRG MGMT >30: CPT

## 2021-09-07 PROCEDURE — 78452 HT MUSCLE IMAGE SPECT MULT: CPT | Mod: 26

## 2021-09-07 PROCEDURE — 99233 SBSQ HOSP IP/OBS HIGH 50: CPT | Mod: 25

## 2021-09-07 PROCEDURE — 93016 CV STRESS TEST SUPVJ ONLY: CPT

## 2021-09-07 PROCEDURE — 93018 CV STRESS TEST I&R ONLY: CPT

## 2021-09-07 PROCEDURE — 99407 BEHAV CHNG SMOKING > 10 MIN: CPT

## 2021-09-07 RX ORDER — PREGABALIN 225 MG/1
1 CAPSULE ORAL
Qty: 0 | Refills: 0 | DISCHARGE

## 2021-09-07 RX ORDER — REGADENOSON 0.08 MG/ML
0.4 INJECTION, SOLUTION INTRAVENOUS ONCE
Refills: 0 | Status: COMPLETED | OUTPATIENT
Start: 2021-09-07 | End: 2021-09-07

## 2021-09-07 RX ORDER — ATORVASTATIN CALCIUM 80 MG/1
40 TABLET, FILM COATED ORAL AT BEDTIME
Refills: 0 | Status: DISCONTINUED | OUTPATIENT
Start: 2021-09-07 | End: 2021-09-07

## 2021-09-07 RX ORDER — ATORVASTATIN CALCIUM 80 MG/1
1 TABLET, FILM COATED ORAL
Qty: 30 | Refills: 0
Start: 2021-09-07 | End: 2021-10-06

## 2021-09-07 RX ADMIN — Medication 81 MILLIGRAM(S): at 16:18

## 2021-09-07 RX ADMIN — REGADENOSON 0.4 MILLIGRAM(S): 0.08 INJECTION, SOLUTION INTRAVENOUS at 14:15

## 2021-09-07 NOTE — DISCHARGE NOTE PROVIDER - PROVIDER TOKENS
PROVIDER:[TOKEN:[[id number]],FOLLOWUP:[2 weeks]],PROVIDER:[TOKEN:[[id number]],FOLLOWUP:[2 weeks]]

## 2021-09-07 NOTE — DISCHARGE NOTE PROVIDER - CARE PROVIDERS DIRECT ADDRESSES
[email or web address]

## 2021-09-07 NOTE — DISCHARGE NOTE PROVIDER - HOSPITAL COURSE
This is a 61 year old male with PMHx of Osteoarthritis and Vertigo who presented to the ED with complaint of shortness of breath and palpitations starting on the day of presentation. The patient reports that he woke up this morning early around 5:30am after a poor night's sleep to cook for the holiday. States that he normally does not drink much coffee but he was tired so he drank two cups of caffeinated Folger's coffee with his B12 OTC pills he takes. Around 1-2 hours after drinking the coffee he developed some palpitations that were associated with shortness of breath. STates that he went to take a shower and it progressed prompting him to come to the ED for evaluation. States he continued to feel short of breath with resolution occurring after being placed on supplemental Oxygen. He is an active smoker with no family history of any heart disease.     TTE within normal limits. EF 58%, normal systolic function.  NST shows This is a 61 year old male with PMHx of Osteoarthritis and Vertigo who presented to the ED with complaint of shortness of breath and palpitations starting on the day of presentation. The patient reports that he woke up this morning early around 5:30am after a poor night's sleep to cook for the holiday. States that he normally does not drink much coffee but he was tired so he drank two cups of caffeinated Folger's coffee with his B12 OTC pills he takes. Around 1-2 hours after drinking the coffee he developed some palpitations that were associated with shortness of breath. STates that he went to take a shower and it progressed prompting him to come to the ED for evaluation. States he continued to feel short of breath with resolution occurring after being placed on supplemental Oxygen. He is an active smoker with no family history of any heart disease.     TTE within normal limits. EF 58%, normal systolic function.  NST negative

## 2021-09-07 NOTE — PROGRESS NOTE ADULT - SUBJECTIVE AND OBJECTIVE BOX
24H events:    Patient is a 61y old Male who presents with a chief complaint of Palpitations and Shortness of Breath (06 Sep 2021 20:51)    Primary diagnosis of Shortness of breath       Today is hospital day 1d. This morning patient was seen and examined at bedside, resting comfortably in bed.    No acute or major events overnight.    PAST MEDICAL & SURGICAL HISTORY  Osteoarthritis    Vertigo    No significant past surgical history      SOCIAL HISTORY:  Social History:  Active smoker; 4 cigarettes a day  No EtOH  No recreational drug use (06 Sep 2021 16:03)      ALLERGIES:  Tylenol (Unknown)    MEDICATIONS:  STANDING MEDICATIONS  aspirin enteric coated 81 milliGRAM(s) Oral daily  enoxaparin Injectable 40 milliGRAM(s) SubCutaneous at bedtime    PRN MEDICATIONS    VITALS:   T(F): 96.8  HR: 58  BP: 122/69  RR: 18  SpO2: 96%    PHYSICAL EXAM:  GENERAL: NAD, well-groomed, well-developed  HEAD:  Atraumatic, Normocephalic  EYES: EOMI  NECK: Supple  NERVOUS SYSTEM:  Alert & Oriented X3, non focal   CHEST/LUNG: Clear to auscultation bilaterally; No rales, rhonchi, wheezing, or rubs  HEART: Regular rate and rhythm; No murmurs, rubs, or gallops  ABDOMEN: Soft, Nontender, Nondistended; Bowel sounds present  EXTREMITIES:  2+ Peripheral Pulses, No clubbing, cyanosis, or edema  LYMPH: No lymphadenopathy noted  SKIN: No rashes or lesions  LABS:                        13.0   6.10  )-----------( 348      ( 07 Sep 2021 06:25 )             39.2     09-07    139  |  106  |  13  ----------------------------<  91  4.4   |  20  |  1.0    Ca    8.7      07 Sep 2021 06:25  Mg     2.0     09-07    TPro  7.2  /  Alb  4.5  /  TBili  <0.2  /  DBili  x   /  AST  21  /  ALT  19  /  AlkPhos  93  09-06          Troponin T, Serum: <0.01 ng/mL (09-07-21 @ 06:25)  Troponin T, Serum: <0.01 ng/mL (09-06-21 @ 20:08)  Troponin T, Serum: <0.01 ng/mL (09-06-21 @ 15:55)  Troponin T, Serum: <0.01 ng/mL (09-06-21 @ 14:00)      CARDIAC MARKERS ( 07 Sep 2021 06:25 )  x     / <0.01 ng/mL / x     / x     / x      CARDIAC MARKERS ( 06 Sep 2021 20:08 )  x     / <0.01 ng/mL / x     / x     / x      CARDIAC MARKERS ( 06 Sep 2021 15:55 )  x     / <0.01 ng/mL / x     / x     / x      CARDIAC MARKERS ( 06 Sep 2021 14:00 )  x     / <0.01 ng/mL / x     / x     / x          RADIOLOGY:

## 2021-09-07 NOTE — DISCHARGE NOTE PROVIDER - CARE PROVIDER_API CALL
Carroll Wells (DO)  Medicine  Physicians  [address] [facility], [address] [place] [address]  Phone: [phone]  Fax: [phone]  Follow Up Time: 2 weeks    Oscar Collazo (MD)  Cardiovascular Disease; Internal Medicine; Interventional Cardiology  [address] [place] [address], [facility] [address]  [place] [address]  Phone: [phone]  Fax: [phone]  Follow Up Time: 2 weeks

## 2021-09-07 NOTE — DISCHARGE NOTE PROVIDER - NSDCCPCAREPLAN_GEN_ALL_CORE_FT
PRINCIPAL DISCHARGE DIAGNOSIS  Diagnosis: Shortness of breath  Assessment and Plan of Treatment: Chest Pain  Chest pain can be caused by many different conditions which may or may not be dangerous. Causes include heartburn, lung infections, heart attack, blood clot in lungs, skin infections, strain or damage to muscle, cartilage, or bones, etc. In addition to a history and physical examination, an electrocardiogram (ECG) or other lab tests may have been performed to determine the cause of your chest pain. Follow up with your primary care provider or with a cardiologist as instructed.   SEEK IMMEDIATE MEDICAL CARE IF YOU HAVE ANY OF THE FOLLOWING SYMPTOMS: worsening chest pain, coughing up blood, unexplained back/neck/jaw pain, severe abdominal pain, dizziness or lightheadedness, fainting, shortness of breath, sweaty or clammy skin, vomiting, or racing heart beat. These symptoms may represent a serious problem that is an emergency. Do not wait to see if the symptoms will go away. Get medical help right away. Call 911 and do not drive yourself to the hospital.        SECONDARY DISCHARGE DIAGNOSES  Diagnosis: Palpitations  Assessment and Plan of Treatment:      PRINCIPAL DISCHARGE DIAGNOSIS  Diagnosis: Shortness of breath  Assessment and Plan of Treatment: Chest Pain  Chest pain can be caused by many different conditions which may or may not be dangerous. Causes include heartburn, lung infections, heart attack, blood clot in lungs, skin infections, strain or damage to muscle, cartilage, or bones, etc. In addition to a history and physical examination, an electrocardiogram (ECG) or other lab tests may have been performed to determine the cause of your chest pain. Follow up with your primary care provider or with a cardiologist as instructed.   SEEK IMMEDIATE MEDICAL CARE IF YOU HAVE ANY OF THE FOLLOWING SYMPTOMS: worsening chest pain, coughing up blood, unexplained back/neck/jaw pain, severe abdominal pain, dizziness or lightheadedness, fainting, shortness of breath, sweaty or clammy skin, vomiting, or racing heart beat. These symptoms may represent a serious problem that is an emergency. Do not wait to see if the symptoms will go away. Get medical help right away. Call 911 and do not drive yourself to the hospital.        SECONDARY DISCHARGE DIAGNOSES  Diagnosis: Palpitations  Assessment and Plan of Treatment: No evidence of Coronary Artery Disease on this hospitalization   Your Nuclear Stress Test was Normal / Echo Normal / Cardiac enzymes were negative / You were seen by Cardiologist Dr Castle you can follow up with him in the office - call and make appointment   Resume meds per this discharge form     PRINCIPAL DISCHARGE DIAGNOSIS  Diagnosis: Shortness of breath  Assessment and Plan of Treatment: Chest Pain  Chest pain can be caused by many different conditions which may or may not be dangerous. Causes include heartburn, lung infections, heart attack, blood clot in lungs, skin infections, strain or damage to muscle, cartilage, or bones, etc. In addition to a history and physical examination, an electrocardiogram (ECG) or other lab tests may have been performed to determine the cause of your chest pain. Follow up with your primary care provider or with a cardiologist as instructed.   SEEK IMMEDIATE MEDICAL CARE IF YOU HAVE ANY OF THE FOLLOWING SYMPTOMS: worsening chest pain, coughing up blood, unexplained back/neck/jaw pain, severe abdominal pain, dizziness or lightheadedness, fainting, shortness of breath, sweaty or clammy skin, vomiting, or racing heart beat. These symptoms may represent a serious problem that is an emergency. Do not wait to see if the symptoms will go away. Get medical help right away. Call 911 and do not drive yourself to the hospital.  Stress test and echo are negative      SECONDARY DISCHARGE DIAGNOSES  Diagnosis: Palpitations  Assessment and Plan of Treatment: No evidence of Coronary Artery Disease on this hospitalization   Your Nuclear Stress Test was Normal / Echo Normal / Cardiac enzymes were negative / You were seen by Cardiologist Dr Castle you can follow up with him in the office - call and make appointment   Resume meds per this discharge form     PRINCIPAL DISCHARGE DIAGNOSIS  Diagnosis: Shortness of breath  Assessment and Plan of Treatment: Chest Pain  Chest pain can be caused by many different conditions which may or may not be dangerous. Causes include heartburn, lung infections, heart attack, blood clot in lungs, skin infections, strain or damage to muscle, cartilage, or bones, etc. In addition to a history and physical examination, an electrocardiogram (ECG) or other lab tests may have been performed to determine the cause of your chest pain. Follow up with your primary care provider or with a cardiologist as instructed.   SEEK IMMEDIATE MEDICAL CARE IF YOU HAVE ANY OF THE FOLLOWING SYMPTOMS: worsening chest pain, coughing up blood, unexplained back/neck/jaw pain, severe abdominal pain, dizziness or lightheadedness, fainting, shortness of breath, sweaty or clammy skin, vomiting, or racing heart beat. These symptoms may represent a serious problem that is an emergency. Do not wait to see if the symptoms will go away. Get medical help right away. Call 911 and do not drive yourself to the hospital.  Stress test and echo are negative      SECONDARY DISCHARGE DIAGNOSES  Diagnosis: Palpitations  Assessment and Plan of Treatment: No evidence of Coronary Artery Disease on this hospitalization   Your Nuclear Stress Test was Normal / Echo Normal / Cardiac enzymes were negative / You were seen by Cardiologist Dr Collazo you can follow up with him in the office - call and make appointment   Resume meds per this discharge form

## 2021-09-07 NOTE — DISCHARGE NOTE PROVIDER - NSDCMRMEDTOKEN_GEN_ALL_CORE_FT
aspirin 81 mg oral delayed release tablet: 1 tab(s) orally once a day  meclizine 25 mg oral tablet: 1 tab(s) orally 3 times a day   Vitamin B-12 1000 mcg oral tablet: 1 tab(s) orally once a day   aspirin 81 mg oral delayed release tablet: 1 tab(s) orally once a day  atorvastatin 40 mg oral tablet: 1 tab(s) orally once a day (at bedtime)  meclizine 25 mg oral tablet: 1 tab(s) orally 3 times a day

## 2021-09-07 NOTE — PROGRESS NOTE ADULT - ASSESSMENT
This is a 61 year old male with PMHx of Osteoarthritis and Vertigo who presented to the ED with complaint of shortness of breath and palpitations starting on the day of presentation.    # SOB  # Palpitations, R/O ACS  - Suspected caffeine and B12 induced. Patient advised to limit the intake of both moving forward   - Admit to telemetry  - F/u Check TSH, A1c, Lipid profile  - EKG changes noted; repeat EKG   - Troponin negative x3  - F/u TTE ordered  - Discussed at bedside with Don Field ordered    # Tobacco Abuse  - Counseled on the importance of smoking cessation; patient actively working on quitting down to 3-4 cigarettes a day from 8 a day.     Activity: As tolerated  Diet: DASH  DVT ppx: Lovenox  GI ppx: Not indicated  Code Status: Full Code  DISPO: Acute

## 2021-09-07 NOTE — DISCHARGE NOTE NURSING/CASE MANAGEMENT/SOCIAL WORK - PATIENT PORTAL LINK FT
You can access the FollowMyHealth Patient Portal offered by [facility] by registering at the following website: [email or web address]. By joining [facility]’s FollowMyHealth portal, you will also be able to view your health information using other applications (apps) compatible with our system.

## 2021-09-10 DIAGNOSIS — R06.02 SHORTNESS OF BREATH: ICD-10-CM

## 2021-09-10 DIAGNOSIS — T43.615A ADVERSE EFFECT OF CAFFEINE, INITIAL ENCOUNTER: ICD-10-CM

## 2021-09-10 DIAGNOSIS — F17.210 NICOTINE DEPENDENCE, CIGARETTES, UNCOMPLICATED: ICD-10-CM

## 2021-09-10 DIAGNOSIS — M19.90 UNSPECIFIED OSTEOARTHRITIS, UNSPECIFIED SITE: ICD-10-CM

## 2021-09-10 DIAGNOSIS — T45.2X5A ADVERSE EFFECT OF VITAMINS, INITIAL ENCOUNTER: ICD-10-CM

## 2021-09-10 DIAGNOSIS — R00.2 PALPITATIONS: ICD-10-CM

## 2021-09-10 DIAGNOSIS — R42 DIZZINESS AND GIDDINESS: ICD-10-CM

## 2021-09-21 ENCOUNTER — EMERGENCY (EMERGENCY)
Facility: HOSPITAL | Age: 61
LOS: 0 days | Discharge: HOME | End: 2021-09-21
Attending: STUDENT IN AN ORGANIZED HEALTH CARE EDUCATION/TRAINING PROGRAM | Admitting: STUDENT IN AN ORGANIZED HEALTH CARE EDUCATION/TRAINING PROGRAM
Payer: MEDICAID

## 2021-09-21 VITALS
SYSTOLIC BLOOD PRESSURE: 122 MMHG | DIASTOLIC BLOOD PRESSURE: 58 MMHG | TEMPERATURE: 97 F | HEART RATE: 58 BPM | OXYGEN SATURATION: 100 % | RESPIRATION RATE: 18 BRPM

## 2021-09-21 VITALS
DIASTOLIC BLOOD PRESSURE: 80 MMHG | RESPIRATION RATE: 18 BRPM | HEART RATE: 77 BPM | TEMPERATURE: 97 F | HEIGHT: 70 IN | SYSTOLIC BLOOD PRESSURE: 149 MMHG | OXYGEN SATURATION: 97 % | WEIGHT: 190.04 LBS

## 2021-09-21 DIAGNOSIS — J45.909 UNSPECIFIED ASTHMA, UNCOMPLICATED: ICD-10-CM

## 2021-09-21 DIAGNOSIS — R11.0 NAUSEA: ICD-10-CM

## 2021-09-21 DIAGNOSIS — E78.5 HYPERLIPIDEMIA, UNSPECIFIED: ICD-10-CM

## 2021-09-21 DIAGNOSIS — R06.02 SHORTNESS OF BREATH: ICD-10-CM

## 2021-09-21 DIAGNOSIS — Z88.8 ALLERGY STATUS TO OTHER DRUGS, MEDICAMENTS AND BIOLOGICAL SUBSTANCES: ICD-10-CM

## 2021-09-21 DIAGNOSIS — Z79.82 LONG TERM (CURRENT) USE OF ASPIRIN: ICD-10-CM

## 2021-09-21 DIAGNOSIS — R42 DIZZINESS AND GIDDINESS: ICD-10-CM

## 2021-09-21 DIAGNOSIS — F17.200 NICOTINE DEPENDENCE, UNSPECIFIED, UNCOMPLICATED: ICD-10-CM

## 2021-09-21 LAB
ALBUMIN SERPL ELPH-MCNC: 4.5 G/DL — SIGNIFICANT CHANGE UP (ref 3.5–5.2)
ALP SERPL-CCNC: 103 U/L — SIGNIFICANT CHANGE UP (ref 30–115)
ALT FLD-CCNC: 24 U/L — SIGNIFICANT CHANGE UP (ref 0–41)
ANION GAP SERPL CALC-SCNC: 12 MMOL/L — SIGNIFICANT CHANGE UP (ref 7–14)
AST SERPL-CCNC: 23 U/L — SIGNIFICANT CHANGE UP (ref 0–41)
BASOPHILS # BLD AUTO: 0.06 K/UL — SIGNIFICANT CHANGE UP (ref 0–0.2)
BASOPHILS NFR BLD AUTO: 0.6 % — SIGNIFICANT CHANGE UP (ref 0–1)
BILIRUB SERPL-MCNC: 0.2 MG/DL — SIGNIFICANT CHANGE UP (ref 0.2–1.2)
BUN SERPL-MCNC: 8 MG/DL — LOW (ref 10–20)
CALCIUM SERPL-MCNC: 9.2 MG/DL — SIGNIFICANT CHANGE UP (ref 8.5–10.1)
CHLORIDE SERPL-SCNC: 106 MMOL/L — SIGNIFICANT CHANGE UP (ref 98–110)
CO2 SERPL-SCNC: 23 MMOL/L — SIGNIFICANT CHANGE UP (ref 17–32)
CREAT SERPL-MCNC: 1.1 MG/DL — SIGNIFICANT CHANGE UP (ref 0.7–1.5)
EOSINOPHIL # BLD AUTO: 0.09 K/UL — SIGNIFICANT CHANGE UP (ref 0–0.7)
EOSINOPHIL NFR BLD AUTO: 0.9 % — SIGNIFICANT CHANGE UP (ref 0–8)
GLUCOSE SERPL-MCNC: 98 MG/DL — SIGNIFICANT CHANGE UP (ref 70–99)
HCT VFR BLD CALC: 37.5 % — LOW (ref 42–52)
HGB BLD-MCNC: 12.4 G/DL — LOW (ref 14–18)
IMM GRANULOCYTES NFR BLD AUTO: 0.5 % — HIGH (ref 0.1–0.3)
LYMPHOCYTES # BLD AUTO: 2.59 K/UL — SIGNIFICANT CHANGE UP (ref 1.2–3.4)
LYMPHOCYTES # BLD AUTO: 26.6 % — SIGNIFICANT CHANGE UP (ref 20.5–51.1)
MCHC RBC-ENTMCNC: 30.2 PG — SIGNIFICANT CHANGE UP (ref 27–31)
MCHC RBC-ENTMCNC: 33.1 G/DL — SIGNIFICANT CHANGE UP (ref 32–37)
MCV RBC AUTO: 91.2 FL — SIGNIFICANT CHANGE UP (ref 80–94)
MONOCYTES # BLD AUTO: 0.5 K/UL — SIGNIFICANT CHANGE UP (ref 0.1–0.6)
MONOCYTES NFR BLD AUTO: 5.1 % — SIGNIFICANT CHANGE UP (ref 1.7–9.3)
NEUTROPHILS # BLD AUTO: 6.43 K/UL — SIGNIFICANT CHANGE UP (ref 1.4–6.5)
NEUTROPHILS NFR BLD AUTO: 66.3 % — SIGNIFICANT CHANGE UP (ref 42.2–75.2)
NRBC # BLD: 0 /100 WBCS — SIGNIFICANT CHANGE UP (ref 0–0)
PLATELET # BLD AUTO: 387 K/UL — SIGNIFICANT CHANGE UP (ref 130–400)
POTASSIUM SERPL-MCNC: 5 MMOL/L — SIGNIFICANT CHANGE UP (ref 3.5–5)
POTASSIUM SERPL-SCNC: 5 MMOL/L — SIGNIFICANT CHANGE UP (ref 3.5–5)
PROT SERPL-MCNC: 7.5 G/DL — SIGNIFICANT CHANGE UP (ref 6–8)
RBC # BLD: 4.11 M/UL — LOW (ref 4.7–6.1)
RBC # FLD: 13.8 % — SIGNIFICANT CHANGE UP (ref 11.5–14.5)
SODIUM SERPL-SCNC: 141 MMOL/L — SIGNIFICANT CHANGE UP (ref 135–146)
WBC # BLD: 9.72 K/UL — SIGNIFICANT CHANGE UP (ref 4.8–10.8)
WBC # FLD AUTO: 9.72 K/UL — SIGNIFICANT CHANGE UP (ref 4.8–10.8)

## 2021-09-21 PROCEDURE — 71046 X-RAY EXAM CHEST 2 VIEWS: CPT | Mod: 26

## 2021-09-21 PROCEDURE — 99285 EMERGENCY DEPT VISIT HI MDM: CPT

## 2021-09-21 PROCEDURE — 93010 ELECTROCARDIOGRAM REPORT: CPT

## 2021-09-21 RX ORDER — MECLIZINE HCL 12.5 MG
25 TABLET ORAL ONCE
Refills: 0 | Status: COMPLETED | OUTPATIENT
Start: 2021-09-21 | End: 2021-09-21

## 2021-09-21 RX ORDER — ALBUTEROL 90 UG/1
1 AEROSOL, METERED ORAL ONCE
Refills: 0 | Status: COMPLETED | OUTPATIENT
Start: 2021-09-21 | End: 2021-09-21

## 2021-09-21 RX ADMIN — ALBUTEROL 1 PUFF(S): 90 AEROSOL, METERED ORAL at 20:25

## 2021-09-21 RX ADMIN — Medication 25 MILLIGRAM(S): at 20:26

## 2021-09-21 NOTE — ED PROVIDER NOTE - ATTENDING CONTRIBUTION TO CARE
I personally evaluated the patient. I reviewed the Resident’s or Physician Assistant’s note (as assigned above), and agree with the findings and plan except as documented in my note.  61 year old male with a pmh of HLD &  vertigo presents here with sob & dizziness that  began approximately 5 hours prior to arrival after inhaling drano. patient states his drain was clogged and placed drano in the clog, inhaled it and felt sob & dizzy. States sob has improved but continues to feel dizzy as if the room is spinning. Endorse nausea no vomiting. No cough cp abdominal pain numbness/tingling weakness in extremities.  on exam  CONSTITUTIONAL: WA / WN / NAD  HEAD: NCAT  EYES: PERRL; EOMI;   ENT: Normal pharynx; mucous membranes pink/moist, no erythema.  NECK: Supple; no meningeal signs  CARD: RRR; nl S1/S2; no M/R/G.   RESP: Respiratory rate and effort are normal; breath sounds clear and equal bilaterally.  ABD: Soft, NT ND   MSK/EXT: No gross deformities; full range of motion.  SKIN: Warm and dry;   NEURO: AAOx3, Motor 5/5 x 4 extremities No facial droop no dysmetria no dysarthria no pronator drift.  PSYCH: Memory Intact, Normal Affect

## 2021-09-21 NOTE — ED PROVIDER NOTE - PATIENT PORTAL LINK FT
You can access the FollowMyHealth Patient Portal offered by [facility] by registering at the following website: [email or web address]. By joining [facility]’s FollowMyHealth portal, you will also be able to view your health information using other applications (apps) compatible with our system.

## 2021-09-21 NOTE — ED PROVIDER NOTE - CLINICAL SUMMARY MEDICAL DECISION MAKING FREE TEXT BOX
61 year old male with a pmh of HLD &  vertigo presents here with sob & dizziness that  began approximately 5 hours prior to arrival after inhaling drano. patient states his drain was clogged and placed drano in the clog, inhaled it and felt sob & dizzy. States sob has improved but continues to feel dizzy as if the room is spinning. Endorse nausea no vomiting. No cough cp abdominal pain numbness/tingling weakness in extremities. VS reviewed. LAbs imaging ekg obtained and reviewed. Patient felt better. Patient a spoken to in detail about results  All questions addressed.  Results of ED work up discussed and patient given a copy of the results. Patient has proper follow up. Return precautions given.

## 2021-09-21 NOTE — ED PROVIDER NOTE - CHILD ABUSE FACILITY
[facility] I personally performed the service described in the documentation recorded by the scribe in my presence, and it accurately and completely records my words and actions.

## 2021-09-21 NOTE — ED PROVIDER NOTE - NSFOLLOWUPINSTRUCTIONS_ED_ALL_ED_FT
Reactive Airways Disease    WHAT YOU NEED TO KNOW:    Reactive airways disease (RAD) is a term used to describe breathing problems in children up to 5 years old. The signs and symptoms of RAD are similar to asthma, such as wheezing and shortness of breath.    DISCHARGE INSTRUCTIONS:    Medicines:     Short-acting bronchodilators: Your child may need short-acting bronchodilators to help open his airways quickly. They relieve sudden, severe symptoms and start to work right away.      Long-acting bronchodilators: Your child may need long-acting bronchodilators to help prevent breathing problems. They control breathing problems by keeping the airways open over time.      Corticosteroids: These medicines help decrease swelling and open your child's airway so he can breathe easier. Your child may breathe the medicine in or swallow it as a liquid, pill, or chewable tablet.      Give your child's medicine as directed. Contact your child's healthcare provider if you think the medicine is not working as expected. Tell him or her if your child is allergic to any medicine. Keep a current list of the medicines, vitamins, and herbs your child takes. Include the amounts, and when, how, and why they are taken. Bring the list or the medicines in their containers to follow-up visits. Carry your child's medicine list with you in case of an emergency.      Do not give aspirin to children under 18 years of age. Your child could develop Reye syndrome if he takes aspirin. Reye syndrome can cause life-threatening brain and liver damage. Check your child's medicine labels for aspirin, salicylates, or oil of wintergreen.     Inhalers:     Metered dose inhaler: This is a small, tube-shaped device. Your child holds the open end inside his mouth. The medicine comes out as a mist when he presses a switch. Your child should breathe in deeply to get the right amount of medicine. He may use a spacer with this inhaler. A spacer is a large tube that holds the mist before your child breathes it in. Inhaler with Spacer (Child)           Nebulizer: A long tube goes from the machine to a small round container that holds asthma medicine. The liquid turns into a mist once the machine is turned on. Your child breathes in this mist through a mouthpiece.       Dry powder inhaler: This is a small tube or disc-shaped device that contains powder asthma medicine. Your child holds the open end inside his mouth. The powder is released when he presses a switch. With this type of inhaler, your child must breathe in hard to suck in the powder.    Prevention:     Use a humidifier: A humidifier will increase air moisture in your home. This may make it easier for your child to breathe. Keep humidifiers out of the reach of children.      No smoking: Do not let anyone smoke around your child or in your home. Cigarette smoke can affect your child’s lungs and cause breathing problems.      Avoid triggers: Certain foods, pollution, perfume, mold, pets, or dust can cause breathing problems.      Manage your child’s symptoms: Follow directions for how to manage your child's cough or shortness of breath while he is active. If his symptoms get worse with exercise, he may need to take medicine through an inhaler 10 to 15 minutes before exercise.      Avoid spreading illness: Keep your child away from others if he has a fever or other symptoms. Do not send him to school or  until his fever is gone and he is feeling better. Keep your child away from large groups of people or others who are sick. This decreases his chance of getting sick.    Follow up with your child's healthcare provider as directed: Write down your questions so you remember to ask them during your child's visits.    Contact your child's healthcare provider if:     Your child is shaky, nervous, or has a headache.      Your child is hoarse, or has a sore throat or upset stomach.      Your infant throws up when he coughs.    Return to the emergency department if:     Your child's wheezing or cough is getting worse.      Your child has trouble breathing, or his lips or fingernails are blue.      Your older child cannot talk in full sentences because he is trying to breathe.      Your child looks restless and is breathing fast.      Your child's nostrils flare out as he tries to breathe. His stomach muscles or the skin over his ribs move in deeply while he tries to breathe.      Your child goes from being restless to being confused or sleepy.         © Copyright [employer] 2019 All illustrations and images included in [employer] are the copyrighted property of [employer] or [employer].      Vertigo  Vertigo is the feeling that you or your surroundings are moving when they are not. Vertigo can be dangerous if it occurs while you are doing something that could endanger you or others, such as driving.    What are the causes?  This condition is caused by a disturbance in the signals that are sent by your body’s sensory systems to your brain. Different causes of a disturbance can lead to vertigo, including:    Infections, especially in the inner ear.  A bad reaction to a drug, or misuse of alcohol and medicines.  Withdrawal from drugs or alcohol.  Quickly changing positions, as when lying down or rolling over in bed.  Migraine headaches.  Decreased blood flow to the brain.  Decreased blood pressure.  Increased pressure in the brain from a head or neck injury, stroke, infection, tumor, or bleeding.  Central nervous system disorders.    What are the signs or symptoms?  Symptoms of this condition usually occur when you move your head or your eyes in different directions. Symptoms may start suddenly, and they usually last for less than a minute. Symptoms may include:    Loss of balance and falling.  Feeling like you are spinning or moving.  Feeling like your surroundings are spinning or moving.  Nausea and vomiting.  Blurred vision or double vision.  Difficulty hearing.  Slurred speech.  Dizziness.  Involuntary eye movement (nystagmus).    Symptoms can be mild and cause only slight annoyance, or they can be severe and interfere with daily life. Episodes of vertigo may return (recur) over time, and they are often triggered by certain movements. Symptoms may improve over time.    How is this diagnosed?  This condition may be diagnosed based on medical history and the quality of your nystagmus. Your health care provider may test your eye movements by asking you to quickly change positions to trigger the nystagmus. This may be called the [place]-Hallpike test, head thrust test, or roll test. You may be referred to a health care provider who specializes in ear, nose, and throat (ENT) problems (otolaryngologist) or a provider who specializes in disorders of the central nervous system (neurologist).    You may have additional testing, including:    A physical exam.  Blood tests.  MRI.  A CT scan.  An electrocardiogram (ECG). This records electrical activity in your heart.  An electroencephalogram (EEG). This records electrical activity in your brain.  Hearing tests.    How is this treated?  Treatment for this condition depends on the cause and the severity of the symptoms. Treatment options include:    Medicines to treat nausea or vertigo. These are usually used for severe cases. Some medicines that are used to treat other conditions may also reduce or eliminate vertigo symptoms. These include:    Medicines that control allergies (antihistamines).  Medicines that control seizures (anticonvulsants).  Medicines that relieve depression (antidepressants).  Medicines that relieve anxiety (sedatives).    Head movements to adjust your inner ear back to normal. If your vertigo is caused by an ear problem, your health care provider may recommend certain movements to correct the problem.  Surgery. This is rare.    Follow these instructions at home:  Safety     Move slowly.Avoid sudden body or head movements.  Avoid driving.  Avoid operating heavy machinery.  Avoid doing any tasks that would cause danger to you or others if you would have a vertigo episode during the task.  If you have trouble walking or keeping your balance, try using a cane for stability. If you feel dizzy or unstable, sit down right away.  Return to your normal activities as told by your health care provider. Ask your health care provider what activities are safe for you.  General instructions     Take over-the-counter and prescription medicines only as told by your health care provider.  Avoid certain positions or movements as told by your health care provider.  Drink enough fluid to keep your urine clear or pale yellow.  Keep all follow-up visits as told by your health care provider. This is important.  Contact a health care provider if:  Your medicines do not relieve your vertigo or they make it worse.  You have a fever.  Your condition gets worse or you develop new symptoms.  Your family or friends notice any behavioral changes.  Your nausea or vomiting gets worse.  You have numbness or a “pins and needles” sensation in part of your body.  Get help right away if:  You have difficulty moving or speaking.  You are always dizzy.  You faint.  You develop severe headaches.  You have weakness in your hands, arms, or legs.  You have changes in your hearing or vision.  You develop a stiff neck.  You develop sensitivity to light.

## 2021-09-21 NOTE — ED PROVIDER NOTE - PHYSICAL EXAMINATION
Physical Exam    Vital Signs: I have reviewed the initial vital signs.  Constitutional: well-nourished, appears stated age, no acute distress  Eyes: Conjunctiva pink, Sclera clear. No nystagmus.   Cardiovascular: S1 and S2, regular rate, regular rhythm, well-perfused extremities, radial pulses equal and 2+ b/l.   Respiratory: unlabored respiratory effort, clear to auscultation bilaterally no wheezing, rales and rhonchi. pt is speaking full sentences. no accessory muscle use.   Gastrointestinal: soft, non-tender, nondistended abdomen, no pulsatile mass, normal bowl sounds, no rebound, no guarding, no organomegaly.   Musculoskeletal: supple neck, no lower extremity edema, no calf tenderness  Integumentary: warm, dry, no rash  Neurologic: awake, alert, cranial nerves II-XII grossly intact, extremities’ motor and sensory functions grossly intact. finger to nose intact. negative pronator drift.   Psychiatric: appropriate mood, appropriate affect

## 2021-09-21 NOTE — ED PROVIDER NOTE - OBJECTIVE STATEMENT
60 y/o male with a PMH of HLD, and vertigo presents to the ED for evaluation of sob, dizziness and nausea that began today after inhaling dust from crystal draino he put in his drain to unclog it around 2pm. pt reports the sob has improved, but he still has the room spinning dizziness. pt reports he did not take his medication for vertigo today and only takes it as needed. pt reports cigarette smoking. pt denies chest pain, back pain, abdominal pain, vomiting, diarrhea, constipation, blood in the stool or urine, fever, chills, or cough.

## 2021-09-21 NOTE — ED PROVIDER NOTE - NS ED ROS FT
CONST: No fever, chills or bodyaches  EYES: No pain, redness, drainage or visual changes.  ENT: No ear pain or discharge, nasal discharge or congestion. No sore throat  CARD: No chest pain, palpitations  RESP: (+) SOB. No cough, hemoptysis. No hx of asthma or COPD  GI: No abdominal pain, N/V/D  : No urinary symptoms  MS: No joint pain, back pain or extremity pain/injury  SKIN: No rashes  NEURO: No headache, (+) dizziness. No paresthesias or LOC

## 2021-09-21 NOTE — ED ADULT NURSE NOTE - NSIMPLEMENTINTERV_GEN_ALL_ED
Implemented All Universal Safety Interventions:  [place] to call system. Call bell, personal items and telephone within reach. Instruct patient to call for assistance. Room bathroom lighting operational. Non-slip footwear when patient is off stretcher. Physically safe environment: no spills, clutter or unnecessary equipment. Stretcher in lowest position, wheels locked, appropriate side rails in place.

## 2021-09-21 NOTE — ED PROVIDER NOTE - PROGRESS NOTE DETAILS
FF: pt reports he is feeling well and his symptoms have resolved. advised of return precautions discussed at bedside. advised to f/u with pcp. agreeable to dc.

## 2021-09-22 PROBLEM — M19.90 UNSPECIFIED OSTEOARTHRITIS, UNSPECIFIED SITE: Chronic | Status: ACTIVE | Noted: 2021-09-06

## 2021-09-22 PROBLEM — R42 DIZZINESS AND GIDDINESS: Chronic | Status: ACTIVE | Noted: 2021-09-06

## 2022-07-25 PROBLEM — Z00.00 ENCOUNTER FOR PREVENTIVE HEALTH EXAMINATION: Status: ACTIVE | Noted: 2022-07-25

## 2023-10-10 NOTE — ED ADULT TRIAGE NOTE - MODE OF ARRIVAL
[employer] in Closure 3 Information: This tab is for additional flaps and grafts above and beyond our usual structured repairs.  Please note if you enter information here it will not currently bill and you will need to add the billing information manually.
